# Patient Record
Sex: MALE | Race: WHITE | Employment: UNEMPLOYED | URBAN - NONMETROPOLITAN AREA
[De-identification: names, ages, dates, MRNs, and addresses within clinical notes are randomized per-mention and may not be internally consistent; named-entity substitution may affect disease eponyms.]

---

## 2019-01-06 ENCOUNTER — HOSPITAL ENCOUNTER (EMERGENCY)
Age: 44
Discharge: HOME | End: 2019-01-06
Attending: EMERGENCY MEDICINE
Payer: COMMERCIAL

## 2019-01-06 VITALS
BODY MASS INDEX: 29.22 KG/M2 | HEART RATE: 90 BPM | WEIGHT: 240 LBS | HEIGHT: 76 IN | SYSTOLIC BLOOD PRESSURE: 139 MMHG | TEMPERATURE: 98.5 F | RESPIRATION RATE: 14 BRPM | DIASTOLIC BLOOD PRESSURE: 93 MMHG | OXYGEN SATURATION: 97 %

## 2019-01-06 DIAGNOSIS — Z76.0 ENCOUNTER FOR MEDICATION REFILL: Primary | ICD-10-CM

## 2019-01-06 PROCEDURE — 99281 EMR DPT VST MAYX REQ PHY/QHP: CPT

## 2019-01-06 RX ORDER — LITHIUM CARBONATE 300 MG
300 TABLET ORAL 3 TIMES DAILY
Qty: 27 TABLET | Refills: 0 | Status: SHIPPED | OUTPATIENT
Start: 2019-01-06

## 2019-01-06 RX ORDER — VENLAFAXINE HYDROCHLORIDE 150 MG/1
150 CAPSULE, EXTENDED RELEASE ORAL DAILY
Qty: 9 CAPSULE | Refills: 0 | Status: SHIPPED | OUTPATIENT
Start: 2019-01-06 | End: 2021-12-01

## 2019-01-06 RX ORDER — VENLAFAXINE HYDROCHLORIDE 150 MG/1
150 CAPSULE, EXTENDED RELEASE ORAL DAILY
COMMUNITY
End: 2019-01-06

## 2019-01-06 RX ORDER — RISPERIDONE 1 MG/1
1 TABLET, FILM COATED ORAL 3 TIMES DAILY
Qty: 9 TABLET | Refills: 0 | Status: SHIPPED | OUTPATIENT
Start: 2019-01-06

## 2019-01-06 RX ORDER — GABAPENTIN 600 MG/1
600 TABLET ORAL 3 TIMES DAILY
Qty: 27 TABLET | Refills: 0 | Status: SHIPPED | OUTPATIENT
Start: 2019-01-06 | End: 2021-12-01

## 2019-10-04 ENCOUNTER — APPOINTMENT (OUTPATIENT)
Dept: CT IMAGING | Age: 44
End: 2019-10-04
Payer: COMMERCIAL

## 2019-10-04 ENCOUNTER — HOSPITAL ENCOUNTER (EMERGENCY)
Age: 44
Discharge: HOME | End: 2019-10-05
Attending: EMERGENCY MEDICINE
Payer: COMMERCIAL

## 2019-10-04 ENCOUNTER — APPOINTMENT (OUTPATIENT)
Dept: ULTRASOUND IMAGING | Age: 44
End: 2019-10-04
Payer: COMMERCIAL

## 2019-10-04 DIAGNOSIS — L03.114 CELLULITIS OF LEFT UPPER EXTREMITY: Primary | ICD-10-CM

## 2019-10-04 DIAGNOSIS — S20.222A CONTUSION OF LEFT SIDE OF BACK, INITIAL ENCOUNTER: ICD-10-CM

## 2019-10-04 LAB
BACTERIA: NEGATIVE /HPF
BILIRUBIN URINE: NEGATIVE MG/DL
BLOOD, URINE: NEGATIVE
CLARITY: CLEAR
COLOR: YELLOW
GLUCOSE, URINE: NEGATIVE MG/DL
KETONES, URINE: NEGATIVE MG/DL
LEUKOCYTE ESTERASE, URINE: NEGATIVE
MUCUS: ABNORMAL HPF
NITRITE URINE, QUANTITATIVE: NEGATIVE
PH, URINE: 7 (ref 5–8)
PROTEIN UA: NEGATIVE MG/DL
RBC URINE: ABNORMAL /HPF (ref 0–3)
SPECIFIC GRAVITY UA: 1.01 (ref 1–1.03)
TRANSITIONAL EPITHELIAL: <1 /HPF
TRICHOMONAS: ABNORMAL /HPF
UROBILINOGEN, URINE: NORMAL MG/DL (ref 0.2–1)
WBC UA: 1 /HPF (ref 0–2)

## 2019-10-04 PROCEDURE — 81001 URINALYSIS AUTO W/SCOPE: CPT

## 2019-10-04 PROCEDURE — 74176 CT ABD & PELVIS W/O CONTRAST: CPT

## 2019-10-04 PROCEDURE — 93971 EXTREMITY STUDY: CPT

## 2019-10-04 PROCEDURE — 96372 THER/PROPH/DIAG INJ SC/IM: CPT

## 2019-10-04 PROCEDURE — 6360000002 HC RX W HCPCS: Performed by: EMERGENCY MEDICINE

## 2019-10-04 PROCEDURE — 6370000000 HC RX 637 (ALT 250 FOR IP): Performed by: EMERGENCY MEDICINE

## 2019-10-04 PROCEDURE — 99284 EMERGENCY DEPT VISIT MOD MDM: CPT

## 2019-10-04 RX ORDER — KETOROLAC TROMETHAMINE 30 MG/ML
30 INJECTION, SOLUTION INTRAMUSCULAR; INTRAVENOUS ONCE
Status: COMPLETED | OUTPATIENT
Start: 2019-10-04 | End: 2019-10-04

## 2019-10-04 RX ORDER — ACETAMINOPHEN 325 MG/1
650 TABLET ORAL ONCE
Status: COMPLETED | OUTPATIENT
Start: 2019-10-04 | End: 2019-10-04

## 2019-10-04 RX ORDER — BUPRENORPHINE AND NALOXONE 8; 2 MG/1; MG/1
2 FILM, SOLUBLE BUCCAL; SUBLINGUAL DAILY
COMMUNITY

## 2019-10-04 RX ORDER — SULFAMETHOXAZOLE AND TRIMETHOPRIM 800; 160 MG/1; MG/1
1 TABLET ORAL ONCE
Status: COMPLETED | OUTPATIENT
Start: 2019-10-04 | End: 2019-10-04

## 2019-10-04 RX ORDER — CEPHALEXIN 250 MG/1
500 CAPSULE ORAL ONCE
Status: COMPLETED | OUTPATIENT
Start: 2019-10-04 | End: 2019-10-04

## 2019-10-04 RX ADMIN — ACETAMINOPHEN 650 MG: 325 TABLET ORAL at 22:32

## 2019-10-04 RX ADMIN — SULFAMETHOXAZOLE AND TRIMETHOPRIM 1 TABLET: 800; 160 TABLET ORAL at 22:33

## 2019-10-04 RX ADMIN — CEPHALEXIN 500 MG: 250 CAPSULE ORAL at 22:33

## 2019-10-04 RX ADMIN — KETOROLAC TROMETHAMINE 30 MG: 30 INJECTION, SOLUTION INTRAMUSCULAR; INTRAVENOUS at 22:32

## 2019-10-04 ASSESSMENT — PAIN SCALES - GENERAL
PAINLEVEL_OUTOF10: 8
PAINLEVEL_OUTOF10: 6

## 2019-10-04 ASSESSMENT — PAIN DESCRIPTION - PAIN TYPE: TYPE: ACUTE PAIN

## 2019-10-04 ASSESSMENT — PAIN DESCRIPTION - PROGRESSION: CLINICAL_PROGRESSION: GRADUALLY WORSENING

## 2019-10-04 ASSESSMENT — PAIN DESCRIPTION - LOCATION: LOCATION: FLANK;ARM

## 2019-10-04 ASSESSMENT — PAIN DESCRIPTION - ORIENTATION: ORIENTATION: LEFT

## 2019-10-04 ASSESSMENT — PAIN DESCRIPTION - ONSET: ONSET: ON-GOING

## 2019-10-04 ASSESSMENT — PAIN DESCRIPTION - DESCRIPTORS: DESCRIPTORS: STABBING

## 2019-10-04 ASSESSMENT — PAIN DESCRIPTION - FREQUENCY: FREQUENCY: CONTINUOUS

## 2019-10-05 VITALS
TEMPERATURE: 99.3 F | HEIGHT: 76 IN | SYSTOLIC BLOOD PRESSURE: 151 MMHG | WEIGHT: 237 LBS | OXYGEN SATURATION: 98 % | DIASTOLIC BLOOD PRESSURE: 105 MMHG | RESPIRATION RATE: 15 BRPM | BODY MASS INDEX: 28.86 KG/M2 | HEART RATE: 94 BPM

## 2019-10-05 RX ORDER — ACETAMINOPHEN 325 MG/1
650 TABLET ORAL EVERY 6 HOURS PRN
Qty: 120 TABLET | Refills: 3 | Status: SHIPPED | OUTPATIENT
Start: 2019-10-05

## 2019-10-05 RX ORDER — SULFAMETHOXAZOLE AND TRIMETHOPRIM 800; 160 MG/1; MG/1
1 TABLET ORAL 2 TIMES DAILY
Qty: 20 TABLET | Refills: 0 | Status: SHIPPED | OUTPATIENT
Start: 2019-10-05 | End: 2019-10-15

## 2019-10-05 RX ORDER — CEPHALEXIN 500 MG/1
500 CAPSULE ORAL 2 TIMES DAILY
Qty: 20 CAPSULE | Refills: 0 | Status: SHIPPED | OUTPATIENT
Start: 2019-10-05 | End: 2019-10-15

## 2019-10-05 RX ORDER — NAPROXEN 500 MG/1
500 TABLET ORAL 2 TIMES DAILY
Qty: 60 TABLET | Refills: 0 | Status: ON HOLD | OUTPATIENT
Start: 2019-10-05 | End: 2021-12-03 | Stop reason: HOSPADM

## 2020-08-29 ENCOUNTER — HOSPITAL ENCOUNTER (EMERGENCY)
Age: 45
Discharge: HOME | End: 2020-08-29
Attending: EMERGENCY MEDICINE
Payer: COMMERCIAL

## 2020-08-29 VITALS
TEMPERATURE: 98 F | BODY MASS INDEX: 28.86 KG/M2 | HEART RATE: 89 BPM | RESPIRATION RATE: 16 BRPM | HEIGHT: 76 IN | SYSTOLIC BLOOD PRESSURE: 124 MMHG | WEIGHT: 237 LBS | DIASTOLIC BLOOD PRESSURE: 87 MMHG | OXYGEN SATURATION: 94 %

## 2020-08-29 PROCEDURE — 99283 EMERGENCY DEPT VISIT LOW MDM: CPT

## 2020-08-29 NOTE — ED NOTES
Discharge instructions reviewed, pt verbalized understanding. Pt left in police custody.       Noemi Concepcion RN  08/29/20 8203

## 2020-08-29 NOTE — ED PROVIDER NOTES
resource strain: Not on file    Food insecurity     Worry: Not on file     Inability: Not on file    Transportation needs     Medical: Not on file     Non-medical: Not on file   Tobacco Use    Smoking status: Current Every Day Smoker     Packs/day: 1.50     Types: Cigarettes    Smokeless tobacco: Never Used   Substance and Sexual Activity    Alcohol use: No    Drug use: Yes     Frequency: 1.0 times per week     Types: Marijuana, Cocaine, IV    Sexual activity: Yes   Lifestyle    Physical activity     Days per week: Not on file     Minutes per session: Not on file    Stress: Not on file   Relationships    Social connections     Talks on phone: Not on file     Gets together: Not on file     Attends [employer] service: Not on file     Active member of club or organization: Not on file     Attends meetings of clubs or organizations: Not on file     Relationship status: Not on file    Intimate partner violence     Fear of current or ex partner: Not on file     Emotionally abused: Not on file     Physically abused: Not on file     Forced sexual activity: Not on file   Other Topics Concern    Not on file   Social History Narrative    Not on file     No current facility-administered medications for this encounter. Current Outpatient Medications   Medication Sig Dispense Refill    naproxen (NAPROSYN) 500 MG tablet Take 1 tablet by mouth 2 times daily 60 tablet 0    acetaminophen (AMINOFEN) 325 MG tablet Take 2 tablets by mouth every 6 hours as needed for Pain 120 tablet 3    buprenorphine-naloxone (SUBOXONE) 8-2 MG FILM SL film Place 2 Film under the tongue daily.  gabapentin (NEURONTIN) 600 MG tablet Take 1 tablet by mouth 3 times daily for 27 doses. . 27 tablet 0    lithium 300 MG tablet Take 1 tablet by mouth 3 times daily (Patient taking differently: Take 300 mg by mouth 3 times daily Indications: 600mg in am and 300mg in pm ) 27 tablet 0    risperiDONE (RISPERDAL) 1 MG tablet Take 1 tablet by mouth three times daily (Patient taking differently: Take 1 mg by mouth 2 times daily ) 9 tablet 0    venlafaxine (EFFEXOR XR) 150 MG extended release capsule Take 1 capsule by mouth daily 9 capsule 0     No Known Allergies    Nursing Notes Reviewed    Physical Exam:  ED Triage Vitals [08/29/20 1421]   Enc Vitals Group      BP (!) 170/111      Pulse 128      Resp 18      Temp 98 °F (36.7 °C)      Temp Source Oral      SpO2 96 %      Weight 237 lb (107.5 kg)      Height 6' 4\" (1.93 m)      Head Circumference       Peak Flow       Pain Score       Pain Loc       Pain Edu? Excl. in [address]? My pulse ox interpretation is - normal    General appearance:  No acute distress. Sitting upright in hallway bed. Pleasant. Skin:  Warm. Dry. No diaphoresis. Eye:  Extraocular movements intact. Pupils equal round react to light. Ears, nose, mouth and throat:  Oral mucosa moist.  No cephalohematoma, bustos sign or raccoon eyes. Neck:  Trachea midline. No midline bony cervical tenderness palpation. Extremity:  Normal ROM. No gross deformity ×4 extremities. Heart:  Regular  Perfusion:  Intact. Respiratory:  Respirations nonlabored. Speaking clearly in full sentences. No respiratory distress. Abdominal:   Non distended. Neurological:  Alert and oriented times 3. Sensation intact to light touch to distal upper/lower extremities; 5/5 and symmetric  and dorsi/plantar flexion. And the toilet steady gait. I have reviewed and interpreted all of the currently available lab results from this visit (if applicable):  No results found for this visit on 08/29/20.    Radiographs (if obtained):  [] The following radiograph was interpreted by myself in the absence of a radiologist:   [] Radiologist's Report Reviewed:  No orders to display         EKG (if obtained): (All EKG's are interpreted by myself in the absence of a cardiologist)    Chart review shows recent radiographs:  No results found.    MDM:  Patient here for opiate overdose, received Narcan in route was awake on arrival, has no complaints. Patient was monitored for approximately one hour and continued to have no symptoms. Patient will be discharged to follow-up as an outpatient, patient referrals given, they understand and agree, return warnings given. Clinical Impression:  1. Accidental fentanyl overdose, initial encounter [facility])      Disposition referral (if applicable): [facility]  [phone]  Schedule an appointment as soon as possible for a visit   [address],[place] [address] PRIMARY CARE PROVIDER (SEE BELOW)    [facility] Emergency Department  [facility] [id number] [phone]  Today  If symptoms worsen    Disposition medications (if applicable):  New Prescriptions    No medications on file       Comment: Please note this report has been produced using speech recognition software and may contain errors related to that system including errors in grammar, punctuation, and spelling, as well as words and phrases that may be inappropriate. If there are any questions or concerns please feel free to contact the dictating provider for clarification.        Britt Patel MD  08/29/20 9625       Britt Patel MD  08/29/20 5116

## 2020-09-09 ENCOUNTER — APPOINTMENT (OUTPATIENT)
Dept: CT IMAGING | Age: 45
End: 2020-09-09
Payer: COMMERCIAL

## 2020-09-09 ENCOUNTER — HOSPITAL ENCOUNTER (EMERGENCY)
Age: 45
Discharge: ANOTHER HOSPITAL | End: 2020-09-09
Attending: EMERGENCY MEDICINE
Payer: COMMERCIAL

## 2020-09-09 VITALS
OXYGEN SATURATION: 97 % | TEMPERATURE: 97.9 F | BODY MASS INDEX: 28.86 KG/M2 | SYSTOLIC BLOOD PRESSURE: 142 MMHG | HEIGHT: 76 IN | DIASTOLIC BLOOD PRESSURE: 116 MMHG | RESPIRATION RATE: 16 BRPM | WEIGHT: 237 LBS | HEART RATE: 61 BPM

## 2020-09-09 LAB
APTT: 32.7 SECONDS (ref 25.1–37.1)
BASOPHILS ABSOLUTE: 0 K/CU MM
BASOPHILS RELATIVE PERCENT: 0.3 % (ref 0–1)
DIFFERENTIAL TYPE: ABNORMAL
EOSINOPHILS ABSOLUTE: 0.2 K/CU MM
EOSINOPHILS RELATIVE PERCENT: 1.5 % (ref 0–3)
HCT VFR BLD CALC: 42.4 % (ref 42–52)
HEMOGLOBIN: 14.7 GM/DL (ref 13.5–18)
IMMATURE NEUTROPHIL %: 0.3 % (ref 0–0.43)
INR BLD: 0.98 INDEX
LACTATE: 1.1 MMOL/L (ref 0.4–2)
LYMPHOCYTES ABSOLUTE: 2.3 K/CU MM
LYMPHOCYTES RELATIVE PERCENT: 20.2 % (ref 24–44)
MCH RBC QN AUTO: 30.8 PG (ref 27–31)
MCHC RBC AUTO-ENTMCNC: 34.7 % (ref 32–36)
MCV RBC AUTO: 88.7 FL (ref 78–100)
MONOCYTES ABSOLUTE: 0.9 K/CU MM
MONOCYTES RELATIVE PERCENT: 7.4 % (ref 0–4)
NUCLEATED RBC %: 0 %
PDW BLD-RTO: 13.5 % (ref 11.7–14.9)
PLATELET # BLD: 341 K/CU MM (ref 140–440)
PMV BLD AUTO: 10.1 FL (ref 7.5–11.1)
PROTHROMBIN TIME: 11.9 SECONDS (ref 11.7–14.5)
RBC # BLD: 4.78 M/CU MM (ref 4.6–6.2)
REASON FOR REJECTION: NORMAL
REJECTED TEST: NORMAL
SEGMENTED NEUTROPHILS ABSOLUTE COUNT: 8.1 K/CU MM
SEGMENTED NEUTROPHILS RELATIVE PERCENT: 70.3 % (ref 36–66)
TOTAL IMMATURE NEUTOROPHIL: 0.03 K/CU MM
TOTAL NUCLEATED RBC: 0 K/CU MM
WBC # BLD: 11.5 K/CU MM (ref 4–10.5)

## 2020-09-09 PROCEDURE — 72125 CT NECK SPINE W/O DYE: CPT

## 2020-09-09 PROCEDURE — 70450 CT HEAD/BRAIN W/O DYE: CPT

## 2020-09-09 PROCEDURE — 85610 PROTHROMBIN TIME: CPT

## 2020-09-09 PROCEDURE — 96361 HYDRATE IV INFUSION ADD-ON: CPT

## 2020-09-09 PROCEDURE — 6360000002 HC RX W HCPCS: Performed by: PHYSICIAN ASSISTANT

## 2020-09-09 PROCEDURE — 85730 THROMBOPLASTIN TIME PARTIAL: CPT

## 2020-09-09 PROCEDURE — 85025 COMPLETE CBC W/AUTO DIFF WBC: CPT

## 2020-09-09 PROCEDURE — 99285 EMERGENCY DEPT VISIT HI MDM: CPT

## 2020-09-09 PROCEDURE — 96375 TX/PRO/DX INJ NEW DRUG ADDON: CPT

## 2020-09-09 PROCEDURE — 2580000003 HC RX 258: Performed by: EMERGENCY MEDICINE

## 2020-09-09 PROCEDURE — 80053 COMPREHEN METABOLIC PANEL: CPT

## 2020-09-09 PROCEDURE — 6360000002 HC RX W HCPCS: Performed by: EMERGENCY MEDICINE

## 2020-09-09 PROCEDURE — 2580000003 HC RX 258: Performed by: PHYSICIAN ASSISTANT

## 2020-09-09 PROCEDURE — 96365 THER/PROPH/DIAG IV INF INIT: CPT

## 2020-09-09 PROCEDURE — 83605 ASSAY OF LACTIC ACID: CPT

## 2020-09-09 PROCEDURE — 2500000003 HC RX 250 WO HCPCS: Performed by: EMERGENCY MEDICINE

## 2020-09-09 PROCEDURE — 83690 ASSAY OF LIPASE: CPT

## 2020-09-09 RX ORDER — DIPHENHYDRAMINE HYDROCHLORIDE 50 MG/ML
50 INJECTION INTRAMUSCULAR; INTRAVENOUS ONCE
Status: COMPLETED | OUTPATIENT
Start: 2020-09-09 | End: 2020-09-09

## 2020-09-09 RX ORDER — MORPHINE SULFATE 4 MG/ML
4 INJECTION, SOLUTION INTRAMUSCULAR; INTRAVENOUS ONCE
Status: COMPLETED | OUTPATIENT
Start: 2020-09-09 | End: 2020-09-09

## 2020-09-09 RX ORDER — METOCLOPRAMIDE HYDROCHLORIDE 5 MG/ML
10 INJECTION INTRAMUSCULAR; INTRAVENOUS ONCE
Status: COMPLETED | OUTPATIENT
Start: 2020-09-09 | End: 2020-09-09

## 2020-09-09 RX ORDER — 0.9 % SODIUM CHLORIDE 0.9 %
1000 INTRAVENOUS SOLUTION INTRAVENOUS ONCE
Status: COMPLETED | OUTPATIENT
Start: 2020-09-09 | End: 2020-09-09

## 2020-09-09 RX ADMIN — DEXTROSE MONOHYDRATE 5 MG/HR: 50 INJECTION, SOLUTION INTRAVENOUS at 07:27

## 2020-09-09 RX ADMIN — MORPHINE SULFATE 4 MG: 4 INJECTION, SOLUTION INTRAMUSCULAR; INTRAVENOUS at 06:42

## 2020-09-09 RX ADMIN — DIPHENHYDRAMINE HYDROCHLORIDE 50 MG: 50 INJECTION INTRAMUSCULAR; INTRAVENOUS at 06:09

## 2020-09-09 RX ADMIN — METOCLOPRAMIDE 10 MG: 5 INJECTION, SOLUTION INTRAMUSCULAR; INTRAVENOUS at 06:10

## 2020-09-09 RX ADMIN — SODIUM CHLORIDE 1000 ML: 9 INJECTION, SOLUTION INTRAVENOUS at 06:09

## 2020-09-09 ASSESSMENT — PAIN DESCRIPTION - FREQUENCY: FREQUENCY: CONTINUOUS

## 2020-09-09 ASSESSMENT — PAIN SCALES - GENERAL
PAINLEVEL_OUTOF10: 10
PAINLEVEL_OUTOF10: 10

## 2020-09-09 ASSESSMENT — PAIN DESCRIPTION - PAIN TYPE: TYPE: ACUTE PAIN

## 2020-09-09 ASSESSMENT — PAIN DESCRIPTION - LOCATION: LOCATION: HEAD

## 2020-09-09 NOTE — ED NOTES
[address] [facility], TRANSFERRED CALL AT 6:22 AM     [facility]  09/09/20 9086

## 2020-09-09 NOTE — ED PROVIDER NOTES
Emergency [address] EMERGENCY DEPARTMENT    Patient: Melchor Garcia  MRN: [id number]  : 1975  Date of Evaluation: 2020  ED Provider: Glen Parra PA-C    Chief Complaint       Chief Complaint   Patient presents with    Headache       [place]     Melchor Garcia is a 39 y.o. male who presents to the emergency department for a headache. Patient states he was arrested after a heroin overdose last week. He states he is on Suboxone but did not receive it while in [facility] so he had a seizure last Monday, 2020, where he fell out of his bunk and hit his head. He was not transported for evaluation at time of the fall. He states he has had a headache ever since. Generalized, aching/sharp, severity 10/10. He states he got out of [facility] on 9/3/2020 and resumed his Suboxone that day. He does admit to using heroin once since his release from [facility] because he thought it would help with his headache. Today he started having nausea and vomiting as well. He denies any neck or back pain. Denies chest pain or sob. Denies abd pain. Denies any other injury from falling out of bunk. ROS     CONSTITUTIONAL:  Denies fever. EYES:  Denies visual changes. HEAD:  + headache. ENT:  Denies earache, nasal congestion, sore throat. NECK:  Denies neck pain. RESPIRATORY:  Denies any shortness of breath. CARDIOVASCULAR:  Denies chest pain. GI:  + n/v.  :  Denies urinary symptoms. MUSCULOSKELETAL:  Denies extremity pain or swelling. BACK:  Denies back pain. INTEGUMENT:  Denies skin changes. LYMPHATIC:  Denies lymphadenopathy. NEUROLOGIC:  + seizure.     Past History     Past Medical History:   Diagnosis Date    Back pain     Bipolar 1 disorder ([facility]     Bipolar affect, depressed ([facility]     Bladder cancer ([facility]     Depression     Hepatitis C     Schizophrenia ([facility]      Past Surgical History:   Procedure Laterality Date    BLADDER SURGERY States had bladder cancer surgery     Social History     Socioeconomic History    Marital status: Single     Spouse name: Not on file    Number of children: Not on file    Years of education: Not on file    Highest education level: Not on file   Occupational History    Not on file   Social Needs    Financial resource strain: Not on file    Food insecurity     Worry: Not on file     Inability: Not on file    Transportation needs     Medical: Not on file     Non-medical: Not on file   Tobacco Use    Smoking status: Current Every Day Smoker     Packs/day: 1.50     Types: Cigarettes    Smokeless tobacco: Never Used   Substance and Sexual Activity    Alcohol use: No    Drug use: Yes     Frequency: 1.0 times per week     Types: Marijuana, Cocaine, IV    Sexual activity: Yes   Lifestyle    Physical activity     Days per week: Not on file     Minutes per session: Not on file    Stress: Not on file   Relationships    Social connections     Talks on phone: Not on file     Gets together: Not on file     Attends Pent[employer] service: Not on file     Active member of club or organization: Not on file     Attends meetings of clubs or organizations: Not on file     Relationship status: Not on file    Intimate partner violence     Fear of current or ex partner: Not on file     Emotionally abused: Not on file     Physically abused: Not on file     Forced sexual activity: Not on file   Other Topics Concern    Not on file   Social History Narrative    Not on file       Medications/Allergies     Previous Medications    ACETAMINOPHEN (AMINOFEN) 325 MG TABLET    Take 2 tablets by mouth every 6 hours as needed for Pain    BUPRENORPHINE-NALOXONE (SUBOXONE) 8-2 MG FILM SL FILM    Place 2 Film under the tongue daily. GABAPENTIN (NEURONTIN) 600 MG TABLET    Take 1 tablet by mouth 3 times daily for 27 doses. Tonye Cogan     LITHIUM 300 MG TABLET    Take 1 tablet by mouth 3 times daily    NAPROXEN (NAPROSYN) 500 MG TABLET    Take 1 tablet by mouth 2 times daily    RISPERIDONE (RISPERDAL) 1 MG TABLET    Take 1 tablet by mouth three times daily    VENLAFAXINE (EFFEXOR XR) 150 MG EXTENDED RELEASE CAPSULE    Take 1 capsule by mouth daily     No Known Allergies     Physical Exam       ED Triage Vitals [09/09/20 0508]   BP Temp Temp Source Pulse Resp SpO2 Height Weight   (!) 161/95 97.9 °F (36.6 °C) Oral 61 16 97 % -- --     GENERAL APPEARANCE:  Well-developed, well-nourished, no acute distress. HEAD:  NC/AT. EYES:  Sclera anicteric. ENT:  Ears, nose, mouth normal.     NECK:  Supple. No cervical spinal tenderness. CARDIO:  RRR. LUNGS:   CTAB. Respirations unlabored. ABDOMEN:  Soft, non-distended, non-tender. BS active. Dry-heaving but no active vomiting at time of assessment. EXTREMITIES:  No acute deformities. SKIN:  Warm and dry. NEUROLOGICAL:  Alert and oriented. PSYCHIATRIC:  Normal mood. Diagnostics     Labs:  Labs Reviewed   CBC WITH AUTO DIFFERENTIAL - Abnormal; Notable for the following components:       Result Value    WBC 11.5 (*)     Segs Relative 70.3 (*)     Lymphocytes % 20.2 (*)     Monocytes % 7.4 (*)     All other components within normal limits   LACTIC ACID, PLASMA   PROTIME-INR   APTT   SPECIMEN REJECTION   COMPREHENSIVE METABOLIC PANEL W/ REFLEX TO MG FOR LOW K   LIPASE     Radiographs:  Ct Head Wo Contrast    Addendum Date: 9/9/2020    ADDENDUM: Findings were discussed with MARVIN Lo at 6:25 am on 9/9/2020. Result Date: 9/9/2020  EXAMINATION: CT OF THE HEAD WITHOUT CONTRAST  9/9/2020 6:01 am TECHNIQUE: CT of the head was performed without the administration of intravenous contrast. Dose modulation, iterative reconstruction, and/or weight based adjustment of the mA/kV was utilized to reduce the radiation dose to as low as reasonably achievable. COMPARISON: None.  HISTORY: ORDERING SYSTEM PROVIDED HISTORY: fall out of bed during a seizure on 8/31, headache TECHNOLOGIST PROVIDED HISTORY: Reason for exam:->fall out of bed during a seizure on 8/31, headache Has a \"code stroke\" or \"stroke alert\" been called? ->No Reason for Exam: fall a week ago now sever head pain FINDINGS: BRAIN/VENTRICLES: 1.5 cm left hemispheric subdural hematoma with acute and chronic components. There is mass effect upon the underlying brain parenchyma with left right midline shift of approximately 1.4 cm. Suspected subtle focus of intraparenchymal hemorrhage within the medial aspect of the left temporal lobe adjacent to the left tentorial leaflet. . ORBITS: The visualized portion of the orbits demonstrate no acute abnormality. SINUSES: Mucous retention cyst or polyp within the left maxillary sinus, otherwise the paranasal sinuses and mastoids are clear. SOFT TISSUES/SKULL:  No acute abnormality of the visualized skull or soft tissues. Left hemispheric subdural hematoma with acute on chronic components measuring 1.5 cm in diameter. Mass effect upon the underlying brain parenchyma resulting in left-to-right midline shift of approximately 1.4 cm. Small focus of intraparenchymal hemorrhage within the medial aspect of the left temporal lobe adjacent to the left tentorial leaflet. Ct Cervical Spine Wo Contrast    Result Date: 9/9/2020  EXAMINATION: CT OF THE CERVICAL SPINE WITHOUT CONTRAST 9/9/2020 7:18 am TECHNIQUE: CT of the cervical spine was performed without the administration of intravenous contrast. Multiplanar reformatted images are provided for review. Dose modulation, iterative reconstruction, and/or weight based adjustment of the mA/kV was utilized to reduce the radiation dose to as low as reasonably achievable. COMPARISON: None.  HISTORY: ORDERING SYSTEM PROVIDED HISTORY: injury TECHNOLOGIST PROVIDED HISTORY: Reason for exam:->injury Reason for Exam: fall Acuity: Acute Type of Exam: Initial Mechanism of Injury: fell 1 week ago Relevant Medical/Surgical History: none FINDINGS: BONES/ALIGNMENT: There is no acute fracture or traumatic malalignment. DEGENERATIVE CHANGES: No significant degenerative disc disease. SOFT TISSUES: There is no prevertebral soft tissue swelling. No acute abnormality of the cervical spine. ED Course and MDM   -  Patient seen and evaluated in the emergency department. -  Triage and nursing notes reviewed and incorporated. -  Old chart records reviewed and incorporated. -  Supervising physician was Dr. Jose Holder. Patient was seen independently  -  Work-up was initiated--pending results at the end of my shift. Case discussed with [employer] PA-C. Please see his note for further details, including final disposition. In light of current events, I did utilize appropriate PPE (including surgical face mask, safety glasses, and gloves, as recommended by the health facility/national standard best practice, during my bedside interactions with the patient. Patient was also masked throughout ED course. Final Impression      1. Subdural hematoma ([address]    2. Intraparenchymal hematoma of brain, left, without loss of consciousness, initial encounter ([address]    3.  Midline shift of brain due to hematoma            DISPOSITION        Cassie Henry, [address] [place], [place]  09/12/20 4625

## 2020-09-09 NOTE — ED PROVIDER NOTES
I assumed care of this patient from Jennifer Hoover at 0600 pending labs and CT evaluation. In short patient does report history of heroin use. Was arrested and patient reports seizure related to withdrawal while he was incarcerated. Morgan Viola out of bed and hit his head. Has had headaches since. Developed vomiting today. Patient returned from CT and found to have subarachnoid hemorrhage, subdural hemorrhage with a 17 mm shift. Neuro exam intact. Dr Abeba Fernandez informed and performing eval. She spoke to [address] [place] [address] to arrange transfer.             Kj Beckford PA-C  09/09/20 1783

## 2020-09-09 NOTE — ED TRIAGE NOTES
Patient reports he had a seizure causing him to fall and hit his head while in senior care on 8/31/2020. Complains of extreme pain in head ever since the seizure accompanied by vomiting.

## 2020-09-09 NOTE — ED PROVIDER NOTES
I independently examined and evaluated Heber Lanza. In brief, 27-year-old male presents with concern for headache. He was in [facility] last week, states he fell out of the bed and struck his head, he had had a seizure from withdrawal he tells me. I was asked to see him after his head CT was done. He is not having any weakness or numbness in his extremities. No difficulty ambulating. Has had a headache since that incident 7 to 10 days ago. He states this morning his headache was worse and he was vomiting and that is what prompted him to come in. No fevers. No vision changes. The headache is across the front of his head. Denies other trauma, states has not fallen since that occurred. He is not on blood thinners. He does admit to using heroin. .    Focused exam revealed patient laying on cot, regular rate and rhythm, nonlabored respirations. Abdomen soft and nondistended. Alert and oriented, finger-nose testing without ataxia, gait is deferred. Strength is 5 out of 5 bilateral upper and lower extremities. Sensation intact light touch throughout. Cranial nerves II through XII are grossly intact. Deep tendon reflexes intact and equal at bilateral patella. .    ED course: Neuro exam is intact, when he came back from head CT the tech came over to let me know he was concerned about the CT head, I pulled it up and does appear to have significant midline shift, at least greater than 1 cm, also appears to have subdural and questionable subarachnoid or intraparenchymal hemorrhage when I am looking at it. It is on the left side. Given the amount of shift that he has I placed consult [facility] immediately, as he prefers to go there rather than [facility]. Nicardipine has been ordered for blood pressure control, morphine for pain, and already received a dose of Reglan and Benadryl without improvement.     Spoke with Heidi at [facility] at 1055 to give report, she will call back once speaks to their team.     Radiology called at 0135 to give official read. [phone] we called [facility] back as had not heard,spoke with Heidi again at [facility]- Dr. Barby Gordon is the accepting to their ED, I gave report to [facility]. We will see if flight is possible for transport given the midline shift. 0980- they are not flying due to fog but are working on ground transport. Ground transport is coming, ETA 30 minutes. He remains in stable condition    Total critical care time today provided was 32 minutes. This excludes seperately billable procedure. Critical care time provided for head bleed that required close evaluation and/or intervention with concern for patient decompensation. All diagnostic, treatment, and disposition decisions were made by myself in conjunction with the advanced practice provider. For all further details of the patient's emergency department visit, please see the advanced practice provider's documentation. Comment: Please note this report has been produced using speech recognition software and may contain errors related to that system including errors in grammar, punctuation, and spelling, as well as words and phrases that may be inappropriate. If there are any questions or concerns please feel free to contact the dictating provider for clarification. Roni Amador MD  09/09/20 1569        Care flight here and transporting patient, neuro exam remains intact, patient stable for transfer.      Roni Amador MD  09/09/20 3109

## 2021-11-30 ENCOUNTER — APPOINTMENT (OUTPATIENT)
Dept: CT IMAGING | Age: 46
DRG: 710 | End: 2021-11-30
Payer: COMMERCIAL

## 2021-11-30 ENCOUNTER — HOSPITAL ENCOUNTER (INPATIENT)
Age: 46
LOS: 2 days | Discharge: HOME | DRG: 710 | End: 2021-12-03
Attending: EMERGENCY MEDICINE | Admitting: INTERNAL MEDICINE
Payer: COMMERCIAL

## 2021-11-30 DIAGNOSIS — L02.419 CELLULITIS AND ABSCESS OF LEG: Primary | ICD-10-CM

## 2021-11-30 DIAGNOSIS — A41.9 SEPTICEMIA (HCC): ICD-10-CM

## 2021-11-30 DIAGNOSIS — L03.119 CELLULITIS AND ABSCESS OF LEG: Primary | ICD-10-CM

## 2021-11-30 LAB
ALBUMIN SERPL-MCNC: 4.5 GM/DL (ref 3.4–5)
ALP BLD-CCNC: 91 IU/L (ref 40–129)
ALT SERPL-CCNC: 45 U/L (ref 10–40)
ANION GAP SERPL CALCULATED.3IONS-SCNC: 15 MMOL/L (ref 4–16)
AST SERPL-CCNC: 25 IU/L (ref 15–37)
BASOPHILS ABSOLUTE: 0 K/CU MM
BASOPHILS RELATIVE PERCENT: 0.2 % (ref 0–1)
BILIRUB SERPL-MCNC: 0.9 MG/DL (ref 0–1)
BUN BLDV-MCNC: 13 MG/DL (ref 6–23)
CALCIUM SERPL-MCNC: 9.8 MG/DL (ref 8.3–10.6)
CHLORIDE BLD-SCNC: 104 MMOL/L (ref 99–110)
CO2: 22 MMOL/L (ref 21–32)
CREAT SERPL-MCNC: 0.9 MG/DL (ref 0.9–1.3)
DIFFERENTIAL TYPE: ABNORMAL
EOSINOPHILS ABSOLUTE: 0 K/CU MM
EOSINOPHILS RELATIVE PERCENT: 0.2 % (ref 0–3)
GFR AFRICAN AMERICAN: >60 ML/MIN/1.73M2
GFR NON-AFRICAN AMERICAN: >60 ML/MIN/1.73M2
GLUCOSE BLD-MCNC: 110 MG/DL (ref 70–99)
HCT VFR BLD CALC: 50.5 % (ref 42–52)
HEMOGLOBIN: 16.7 GM/DL (ref 13.5–18)
IMMATURE NEUTROPHIL %: 0.3 % (ref 0–0.43)
LACTATE: 2.1 MMOL/L (ref 0.4–2)
LYMPHOCYTES ABSOLUTE: 2.6 K/CU MM
LYMPHOCYTES RELATIVE PERCENT: 19.6 % (ref 24–44)
MCH RBC QN AUTO: 30.1 PG (ref 27–31)
MCHC RBC AUTO-ENTMCNC: 33.1 % (ref 32–36)
MCV RBC AUTO: 91 FL (ref 78–100)
MONOCYTES ABSOLUTE: 0.9 K/CU MM
MONOCYTES RELATIVE PERCENT: 6.6 % (ref 0–4)
NUCLEATED RBC %: 0 %
PDW BLD-RTO: 13.1 % (ref 11.7–14.9)
PLATELET # BLD: 329 K/CU MM (ref 140–440)
PMV BLD AUTO: 9.3 FL (ref 7.5–11.1)
POTASSIUM SERPL-SCNC: 4.2 MMOL/L (ref 3.5–5.1)
RBC # BLD: 5.55 M/CU MM (ref 4.6–6.2)
SEGMENTED NEUTROPHILS ABSOLUTE COUNT: 9.8 K/CU MM
SEGMENTED NEUTROPHILS RELATIVE PERCENT: 73.1 % (ref 36–66)
SODIUM BLD-SCNC: 141 MMOL/L (ref 135–145)
TOTAL IMMATURE NEUTOROPHIL: 0.04 K/CU MM
TOTAL NUCLEATED RBC: 0 K/CU MM
TOTAL PROTEIN: 8.1 GM/DL (ref 6.4–8.2)
WBC # BLD: 13.5 K/CU MM (ref 4–10.5)

## 2021-11-30 PROCEDURE — 96365 THER/PROPH/DIAG IV INF INIT: CPT

## 2021-11-30 PROCEDURE — 83605 ASSAY OF LACTIC ACID: CPT

## 2021-11-30 PROCEDURE — 87186 SC STD MICRODIL/AGAR DIL: CPT

## 2021-11-30 PROCEDURE — 87070 CULTURE OTHR SPECIMN AEROBIC: CPT

## 2021-11-30 PROCEDURE — 87040 BLOOD CULTURE FOR BACTERIA: CPT

## 2021-11-30 PROCEDURE — 85025 COMPLETE CBC W/AUTO DIFF WBC: CPT

## 2021-11-30 PROCEDURE — 6360000004 HC RX CONTRAST MEDICATION: Performed by: EMERGENCY MEDICINE

## 2021-11-30 PROCEDURE — 2580000003 HC RX 258: Performed by: EMERGENCY MEDICINE

## 2021-11-30 PROCEDURE — 96367 TX/PROPH/DG ADDL SEQ IV INF: CPT

## 2021-11-30 PROCEDURE — 2500000003 HC RX 250 WO HCPCS: Performed by: PHYSICIAN ASSISTANT

## 2021-11-30 PROCEDURE — 99283 EMERGENCY DEPT VISIT LOW MDM: CPT

## 2021-11-30 PROCEDURE — 87077 CULTURE AEROBIC IDENTIFY: CPT

## 2021-11-30 PROCEDURE — 87075 CULTR BACTERIA EXCEPT BLOOD: CPT

## 2021-11-30 PROCEDURE — 96375 TX/PRO/DX INJ NEW DRUG ADDON: CPT

## 2021-11-30 PROCEDURE — 80053 COMPREHEN METABOLIC PANEL: CPT

## 2021-11-30 PROCEDURE — 73701 CT LOWER EXTREMITY W/DYE: CPT

## 2021-11-30 PROCEDURE — 2580000003 HC RX 258: Performed by: PHYSICIAN ASSISTANT

## 2021-11-30 PROCEDURE — 6360000002 HC RX W HCPCS: Performed by: PHYSICIAN ASSISTANT

## 2021-11-30 PROCEDURE — 6370000000 HC RX 637 (ALT 250 FOR IP): Performed by: PHYSICIAN ASSISTANT

## 2021-11-30 RX ORDER — KETOROLAC TROMETHAMINE 30 MG/ML
15 INJECTION, SOLUTION INTRAMUSCULAR; INTRAVENOUS ONCE
Status: COMPLETED | OUTPATIENT
Start: 2021-11-30 | End: 2021-11-30

## 2021-11-30 RX ORDER — SODIUM CHLORIDE, SODIUM LACTATE, POTASSIUM CHLORIDE, AND CALCIUM CHLORIDE .6; .31; .03; .02 G/100ML; G/100ML; G/100ML; G/100ML
1000 INJECTION, SOLUTION INTRAVENOUS ONCE
Status: COMPLETED | OUTPATIENT
Start: 2021-11-30 | End: 2021-12-01

## 2021-11-30 RX ORDER — LIDOCAINE HYDROCHLORIDE 20 MG/ML
10 INJECTION, SOLUTION EPIDURAL; INFILTRATION; INTRACAUDAL; PERINEURAL ONCE
Status: COMPLETED | OUTPATIENT
Start: 2021-11-30 | End: 2021-11-30

## 2021-11-30 RX ORDER — CLINDAMYCIN PHOSPHATE 600 MG/50ML
600 INJECTION INTRAVENOUS ONCE
Status: COMPLETED | OUTPATIENT
Start: 2021-11-30 | End: 2021-11-30

## 2021-11-30 RX ORDER — HYDROCODONE BITARTRATE AND ACETAMINOPHEN 5; 325 MG/1; MG/1
1 TABLET ORAL ONCE
Status: COMPLETED | OUTPATIENT
Start: 2021-11-30 | End: 2021-11-30

## 2021-11-30 RX ORDER — 0.9 % SODIUM CHLORIDE 0.9 %
1000 INTRAVENOUS SOLUTION INTRAVENOUS ONCE
Status: COMPLETED | OUTPATIENT
Start: 2021-11-30 | End: 2021-11-30

## 2021-11-30 RX ORDER — VANCOMYCIN 2 G/400ML
20 INJECTION, SOLUTION INTRAVENOUS ONCE
Status: COMPLETED | OUTPATIENT
Start: 2021-11-30 | End: 2021-12-01

## 2021-11-30 RX ADMIN — HYDROCODONE BITARTRATE AND ACETAMINOPHEN 1 TABLET: 5; 325 TABLET ORAL at 20:45

## 2021-11-30 RX ADMIN — IOPAMIDOL 80 ML: 755 INJECTION, SOLUTION INTRAVENOUS at 22:05

## 2021-11-30 RX ADMIN — LIDOCAINE HYDROCHLORIDE 10 ML: 20 INJECTION, SOLUTION EPIDURAL; INFILTRATION; INTRACAUDAL at 21:49

## 2021-11-30 RX ADMIN — VANCOMYCIN 2000 MG: 2 INJECTION, SOLUTION INTRAVENOUS at 22:55

## 2021-11-30 RX ADMIN — CLINDAMYCIN PHOSPHATE 600 MG: 600 INJECTION, SOLUTION INTRAVENOUS at 20:44

## 2021-11-30 RX ADMIN — SODIUM CHLORIDE 1000 ML: 9 INJECTION, SOLUTION INTRAVENOUS at 20:43

## 2021-11-30 RX ADMIN — KETOROLAC TROMETHAMINE 15 MG: 30 INJECTION, SOLUTION INTRAMUSCULAR at 20:45

## 2021-11-30 RX ADMIN — SODIUM CHLORIDE, POTASSIUM CHLORIDE, SODIUM LACTATE AND CALCIUM CHLORIDE 1000 ML: 600; 310; 30; 20 INJECTION, SOLUTION INTRAVENOUS at 23:23

## 2021-11-30 ASSESSMENT — PAIN SCALES - GENERAL
PAINLEVEL_OUTOF10: 4
PAINLEVEL_OUTOF10: 6

## 2021-11-30 ASSESSMENT — PAIN DESCRIPTION - LOCATION: LOCATION: LEG

## 2021-11-30 ASSESSMENT — PAIN DESCRIPTION - ORIENTATION: ORIENTATION: LEFT

## 2021-11-30 NOTE — ED PROVIDER NOTES
EMERGENCY DEPARTMENT ENCOUNTER      PCP: No primary care provider on file. CHIEF COMPLAINT    Chief Complaint   Patient presents with    Abscess     left thigh, iv drug user-used meth several days ago and noticed abscess last night and draining pus, used meth in close proximity to infection last night also, also using fentanyl       Patient staffed with supervising physician Dr. Dutch Sullivan    \[facility] [place] [facility]\""    Lizzette Arboleda is a 55 y.o. male who presents with left thigh infection. Onset 3 days ago. Patient states area of redness started in region where he was trying to inject meth. Patient states he has history of abscess in similar location and current symptoms feel similar. Pain worsens with direct palpation and movement. No known alleviating factors. Patient denies history of diabetes. Patient denies chest pain, shortness of breath, abdominal pain, fever.       REVIEW OF SYSTEMS    Constitutional:  Denies fever  HENT:  Denies sore throat or ear pain   Respiratory:  Denies cough or shortness of breath   Cardiovascular:  Denies chest pain  GI:  Denies abdominal pain, vomiting, or diarrhea   Musculoskeletal: See HPI  Skin:  See HPI  Neurologic:  Denies focal weakness or sensory changes   Lymphatic:  Denies swollen glands     All other review of systems are negative  See HPI and nursing notes for additional information     PAST MEDICAL HISTORY    Past Medical History:   Diagnosis Date    Anxiety     Back pain     Bipolar 1 disorder ([address]     Bipolar affect, depressed ([address]     Bipolar disorder ([address]     Bladder cancer ([address]     Cancer ([address]     bladder    Depression     Hepatitis C     Herniated disc     lumbar    Osteoarthritis     Psychosis ([address]     Schizoaffective disorder ([address]     Schizophrenia ([address]     Scoliosis        SURGICAL HISTORY    Past Surgical History:   Procedure Laterality Date    BLADDER SURGERY      States had bladder cancer surgery    TUMOR REMOVAL  2008    bladder S/P cancer CURRENT MEDICATIONS    Current Outpatient Rx   Medication Sig Dispense Refill    naproxen (NAPROSYN) 500 MG tablet Take 1 tablet by mouth 2 times daily 60 tablet 0    acetaminophen (AMINOFEN) 325 MG tablet Take 2 tablets by mouth every 6 hours as needed for Pain 120 tablet 3    buprenorphine-naloxone (SUBOXONE) 8-2 MG FILM SL film Place 2 Film under the tongue daily.  gabapentin (NEURONTIN) 600 MG tablet Take 1 tablet by mouth 3 times daily for 27 doses. . 27 tablet 0    lithium 300 MG tablet Take 1 tablet by mouth 3 times daily (Patient taking differently: Take 300 mg by mouth 3 times daily Indications: 600mg in am and 300mg in pm ) 27 tablet 0    risperiDONE (RISPERDAL) 1 MG tablet Take 1 tablet by mouth three times daily (Patient taking differently: Take 1 mg by mouth 2 times daily ) 9 tablet 0    venlafaxine (EFFEXOR XR) 150 MG extended release capsule Take 1 capsule by mouth daily 9 capsule 0    gabapentin (NEURONTIN) 600 MG tablet Take 1 tablet by mouth 4 times daily. 120 tablet 1    QUEtiapine (SEROQUEL) 200 MG tablet Take 1 tablet by mouth nightly. 30 tablet 3    rOPINIRole (REQUIP) 1 MG tablet Take 1 tablet by mouth nightly. 30 tablet 3    gabapentin (NEURONTIN) 800 MG tablet Take 1 tablet by mouth 3 times daily. 90 tablet 2    venlafaxine (EFFEXOR-XR) 150 MG XR capsule Take 1 capsule by mouth daily. 30 capsule 3    lithium 300 MG capsule Take 1 capsule by mouth 2 times daily (with meals). 60 capsule 3    ranitidine (ZANTAC) 150 MG capsule Take 1 capsule by mouth 2 times daily. 60 capsule 6    rOPINIRole (REQUIP) 0.5 MG tablet Take 1 tablet by mouth every evening.  30 tablet 3       ALLERGIES    Allergies   Allergen Reactions    Codeine      Sweaty, anxious, abdominal pain       FAMILY HISTORY    Family History   Problem Relation Age of Onset    Diabetes Mother     Cancer Mother         lung    Heart Disease Mother     Seizures Mother     Seizures Sister     Cancer Maternal Uncle         lung    Cancer Maternal Grandmother         lung    Cancer Maternal Uncle         lung       SOCIAL HISTORY    Social History     Socioeconomic History    Marital status: Single     Spouse name: Jess Kong Number of children: 3    Years of education: 15    Highest education level: None   Occupational History    Occupation: unemployed   Tobacco Use    Smoking status: Current Every Day Smoker     Packs/day: 1.50     Years: 22.00     Pack years: 33.00     Types: Cigarettes    Smokeless tobacco: Never Used   Vaping Use    Vaping Use: Never used   Substance and Sexual Activity    Alcohol use: No    Drug use: Yes     Frequency: 1.0 times per week     Types: Marijuana (Weed), IV, Methamphetamines (Crystal Meth)     Comment: fentanyl    Sexual activity: Yes   Other Topics Concern    None   Social History Narrative    ** Merged History Encounter **          Social Determinants of Health     Financial Resource Strain:     Difficulty of Paying Living Expenses: Not on file   Food Insecurity:     Worried About Running Out of Food in the Last Year: Not on file    Nahed of Food in the Last Year: Not on file   Transportation Needs:     Lack of Transportation (Medical): Not on file    Lack of Transportation (Non-Medical):  Not on file   Physical Activity:     Days of Exercise per Week: Not on file    Minutes of Exercise per Session: Not on file   Stress:     Feeling of Stress : Not on file   Social Connections:     Frequency of Communication with Friends and Family: Not on file    Frequency of Social Gatherings with Friends and Family: Not on file    Attends [employer]ology Services: Not on file    Active Member of Clubs or Organizations: Not on file    Attends Club or Organization Meetings: Not on file    Marital Status: Not on file   Intimate Partner Violence:     Fear of Current or Ex-Partner: Not on file    Emotionally Abused: Not on file    Physically Abused: Not on file    Sexually Abused: Not on file   Housing Stability:     Unable to Pay for Housing in the Last Year: Not on file    Number of Mario in the Last Year: Not on file    Unstable Housing in the Last Year: Not on file       PHYSICAL EXAM    VITAL SIGNS: BP (!) 155/97   Temp 98.4 °F (36.9 °C) (Oral)   Resp 18   Ht 6' 4\" (1.93 m)   Wt 230 lb (104.3 kg)   SpO2 98%   BMI 28.00 kg/m²   Constitutional:  Well developed, well nourished  HENT:  Atraumatic, Normocephalic  Respiratory:  No respiratory distress, normal breath sounds   Cardiovascular: Tachycardic, normal rhythm, no obvious murmurs  GI:  Soft, nondistended, nontender  Musculoskeletal: Left medial thigh there are multiple scars. Moderate erythema and swelling diffusely to the medial thigh. Area is generally indurated. There is area of palpable fluctuance measuring approximately 1 cm x 1 cm and a second area of fluctuance measuring approximately 3 cm x 3 cm. Integument:  Left medial thigh there are multiple scars. Moderate erythema and swelling diffusely to the medial thigh. Area is generally indurated. There is area of palpable fluctuance measuring approximately 1 cm x 1 cm and a second area of fluctuance measuring approximately 3 cm x 3 cm. Neurological: alert and oriented, no focal deficits         ________________________________________________________________________    PROCEDURES: Incision and Drainage Procedure Note    Indication: Cutaneous Abscess    Procedure:    - Procedure explained, including risks and benefits explained to the patient who expressed understanding. All questions were answered. Verbal consent obtained. - The patient was positioned appropriately and Area was prepped and draped in the usual sterile fashion using Betadine.     - The affected area was anesthetized using 2% lidocaine using 10 mL.      - Area of greatest induration was incised utilizing a #11 blade - incision length was approximately 1 cm to more anterior abscess and 2 cm to or posterior abscess      - Wound culture was obtained and sent to lab. -Moderate amount of pus mixed with blood was expressed. Loculations were broken up using a blunt probe technique. Cavity was irrigated with sterile saline.   - Wound cavity was packed with 1/4\" iodoform gauze into larger more posterior abscess   - Wound was dressed with sterile nonstick adhesive gauze, sterile 4x4 gauze, and tape       The patient tolerated the procedure well without complication. ________________________________________________________________________      ED COURSE & MEDICAL DECISION MAKING      Patient presents as above. Patient provided IV fluids, Toradol and Norco.  Due to extent of infection, blood cultures, lactic acid and CT imaging ordered for further evaluation. IV vancomycin and clindamycin is ordered. See above procedure note for abscess incision and drainage. CBC shows elevated white blood cell count of 13.5. Remainder lab work and CT imaging pending at the end of my shift, see supervising physician note for final impression and plan. Clinical  IMPRESSION    Cellulitis and abscess of left thigh    See supervising physician note for final impression and plan    Comment: Please note this report has been produced using speech recognition software and may contain errors related to that system including errors in grammar, punctuation, and spelling, as well as words and phrases that may be inappropriate. If there are any questions or concerns please feel free to contact the dictating provider for clarification.         Jose Carey PA-C  11/30/21 5232

## 2021-11-30 NOTE — CONSULTS
PHARMACY VANCOMYCIN MONITORING & DOSING SERVICE    Ty Wood is a 55 y.o. male started on vancomycin for skin soft tissue infection. Pharmacy consulted by ED provider PAOLO Ghosh to order a dose of vancomycin in the emergency department. Other antimicrobials: clindamycin    Ht Readings from Last 1 Encounters:   11/30/21 6' 4\" (1.93 m)     Wt Readings from Last 3 Encounters:   11/30/21 230 lb (104.3 kg)   09/09/20 237 lb (107.5 kg)   08/29/20 237 lb (107.5 kg)        Pertinent Laboratory Values:   Temp Readings from Last 3 Encounters:   11/30/21 98.4 °F (36.9 °C) (Oral)   09/09/20 97.9 °F (36.6 °C) (Oral)   08/29/20 98 °F (36.7 °C) (Oral)     No results for input(s): WBC, LACTATE in the last 72 hours. No results for input(s): BUN, CREATININE in the last 72 hours. CrCl cannot be calculated (Patient's most recent lab result is older than the maximum 10 days allowed. ). No intake or output data in the 24 hours ending 11/30/21 1108    Assessment/Plan:  Pharmacy will order vancomycin 2000 mg (20 mg/kg). Please note, pharmacy will order a one-time dose of vancomycin for the Emergency Department. The consult will need to be re-ordered if vancomycin is to continue upon admission. Thank you for the consult.   Sarina Andrew, [address] [place] [address], [address] [place] [address] Tae  11/30/2021 6:54 PM

## 2021-12-01 ENCOUNTER — ANESTHESIA (OUTPATIENT)
Dept: OPERATING ROOM | Age: 46
DRG: 710 | End: 2021-12-01
Payer: COMMERCIAL

## 2021-12-01 ENCOUNTER — ANESTHESIA EVENT (OUTPATIENT)
Dept: OPERATING ROOM | Age: 46
DRG: 710 | End: 2021-12-01
Payer: COMMERCIAL

## 2021-12-01 VITALS
RESPIRATION RATE: 20 BRPM | OXYGEN SATURATION: 96 % | DIASTOLIC BLOOD PRESSURE: 88 MMHG | SYSTOLIC BLOOD PRESSURE: 126 MMHG

## 2021-12-01 PROBLEM — L02.416 ABSCESS OF LEFT THIGH: Status: ACTIVE | Noted: 2021-12-01

## 2021-12-01 LAB
ALBUMIN SERPL-MCNC: 3.4 GM/DL (ref 3.4–5)
ALP BLD-CCNC: 68 IU/L (ref 40–129)
ALT SERPL-CCNC: 31 U/L (ref 10–40)
ANION GAP SERPL CALCULATED.3IONS-SCNC: 11 MMOL/L (ref 4–16)
AST SERPL-CCNC: 18 IU/L (ref 15–37)
BASOPHILS ABSOLUTE: 0 K/CU MM
BASOPHILS RELATIVE PERCENT: 0.2 % (ref 0–1)
BILIRUB SERPL-MCNC: 0.8 MG/DL (ref 0–1)
BUN BLDV-MCNC: 12 MG/DL (ref 6–23)
CALCIUM SERPL-MCNC: 8.4 MG/DL (ref 8.3–10.6)
CHLORIDE BLD-SCNC: 106 MMOL/L (ref 99–110)
CO2: 23 MMOL/L (ref 21–32)
CREAT SERPL-MCNC: 1 MG/DL (ref 0.9–1.3)
DIFFERENTIAL TYPE: ABNORMAL
EOSINOPHILS ABSOLUTE: 0.1 K/CU MM
EOSINOPHILS RELATIVE PERCENT: 0.9 % (ref 0–3)
GFR AFRICAN AMERICAN: >60 ML/MIN/1.73M2
GFR NON-AFRICAN AMERICAN: >60 ML/MIN/1.73M2
GLUCOSE BLD-MCNC: 125 MG/DL (ref 70–99)
HCT VFR BLD CALC: 41.2 % (ref 42–52)
HEMOGLOBIN: 13.5 GM/DL (ref 13.5–18)
IMMATURE NEUTROPHIL %: 0.3 % (ref 0–0.43)
INR BLD: 0.97 INDEX
LACTATE: 1 MMOL/L (ref 0.4–2)
LYMPHOCYTES ABSOLUTE: 1.8 K/CU MM
LYMPHOCYTES RELATIVE PERCENT: 18.1 % (ref 24–44)
MCH RBC QN AUTO: 30.3 PG (ref 27–31)
MCHC RBC AUTO-ENTMCNC: 32.8 % (ref 32–36)
MCV RBC AUTO: 92.4 FL (ref 78–100)
MONOCYTES ABSOLUTE: 0.9 K/CU MM
MONOCYTES RELATIVE PERCENT: 8.7 % (ref 0–4)
NUCLEATED RBC %: 0 %
PDW BLD-RTO: 13.2 % (ref 11.7–14.9)
PLATELET # BLD: 279 K/CU MM (ref 140–440)
PMV BLD AUTO: 9.4 FL (ref 7.5–11.1)
POTASSIUM SERPL-SCNC: 3.7 MMOL/L (ref 3.5–5.1)
PROTHROMBIN TIME: 12.5 SECONDS (ref 11.7–14.5)
RBC # BLD: 4.46 M/CU MM (ref 4.6–6.2)
SARS-COV-2, NAAT: NOT DETECTED
SEGMENTED NEUTROPHILS ABSOLUTE COUNT: 7.1 K/CU MM
SEGMENTED NEUTROPHILS RELATIVE PERCENT: 71.8 % (ref 36–66)
SODIUM BLD-SCNC: 140 MMOL/L (ref 135–145)
SOURCE: NORMAL
TOTAL IMMATURE NEUTOROPHIL: 0.03 K/CU MM
TOTAL NUCLEATED RBC: 0 K/CU MM
TOTAL PROTEIN: 6.1 GM/DL (ref 6.4–8.2)
WBC # BLD: 9.9 K/CU MM (ref 4–10.5)

## 2021-12-01 PROCEDURE — 99255 IP/OBS CONSLTJ NEW/EST HI 80: CPT | Performed by: SURGERY

## 2021-12-01 PROCEDURE — 6370000000 HC RX 637 (ALT 250 FOR IP): Performed by: INTERNAL MEDICINE

## 2021-12-01 PROCEDURE — 2500000003 HC RX 250 WO HCPCS: Performed by: SURGERY

## 2021-12-01 PROCEDURE — 6360000002 HC RX W HCPCS: Performed by: NURSE ANESTHETIST, CERTIFIED REGISTERED

## 2021-12-01 PROCEDURE — 6360000002 HC RX W HCPCS: Performed by: SURGERY

## 2021-12-01 PROCEDURE — 87205 SMEAR GRAM STAIN: CPT

## 2021-12-01 PROCEDURE — 85610 PROTHROMBIN TIME: CPT

## 2021-12-01 PROCEDURE — 7100000001 HC PACU RECOVERY - ADDTL 15 MIN: Performed by: SURGERY

## 2021-12-01 PROCEDURE — 87635 SARS-COV-2 COVID-19 AMP PRB: CPT

## 2021-12-01 PROCEDURE — 6360000002 HC RX W HCPCS: Performed by: EMERGENCY MEDICINE

## 2021-12-01 PROCEDURE — 6370000000 HC RX 637 (ALT 250 FOR IP): Performed by: SURGERY

## 2021-12-01 PROCEDURE — 27301 DRAIN THIGH/KNEE LESION: CPT | Performed by: SURGERY

## 2021-12-01 PROCEDURE — 6360000002 HC RX W HCPCS: Performed by: INTERNAL MEDICINE

## 2021-12-01 PROCEDURE — 2580000003 HC RX 258: Performed by: INTERNAL MEDICINE

## 2021-12-01 PROCEDURE — 87070 CULTURE OTHR SPECIMN AEROBIC: CPT

## 2021-12-01 PROCEDURE — 3700000001 HC ADD 15 MINUTES (ANESTHESIA): Performed by: SURGERY

## 2021-12-01 PROCEDURE — 7100000000 HC PACU RECOVERY - FIRST 15 MIN: Performed by: SURGERY

## 2021-12-01 PROCEDURE — 0J9M0ZZ DRAINAGE OF LEFT UPPER LEG SUBCUTANEOUS TISSUE AND FASCIA, OPEN APPROACH: ICD-10-PCS | Performed by: SURGERY

## 2021-12-01 PROCEDURE — 2580000003 HC RX 258: Performed by: SURGERY

## 2021-12-01 PROCEDURE — 2060000000 HC ICU INTERMEDIATE R&B

## 2021-12-01 PROCEDURE — 83605 ASSAY OF LACTIC ACID: CPT

## 2021-12-01 PROCEDURE — 80053 COMPREHEN METABOLIC PANEL: CPT

## 2021-12-01 PROCEDURE — 6360000002 HC RX W HCPCS: Performed by: ANESTHESIOLOGY

## 2021-12-01 PROCEDURE — 3700000000 HC ANESTHESIA ATTENDED CARE: Performed by: SURGERY

## 2021-12-01 PROCEDURE — 85025 COMPLETE CBC W/AUTO DIFF WBC: CPT

## 2021-12-01 PROCEDURE — 3600000002 HC SURGERY LEVEL 2 BASE: Performed by: SURGERY

## 2021-12-01 PROCEDURE — 2709999900 HC NON-CHARGEABLE SUPPLY: Performed by: SURGERY

## 2021-12-01 PROCEDURE — 3600000012 HC SURGERY LEVEL 2 ADDTL 15MIN: Performed by: SURGERY

## 2021-12-01 PROCEDURE — 87075 CULTR BACTERIA EXCEPT BLOOD: CPT

## 2021-12-01 RX ORDER — VENLAFAXINE HYDROCHLORIDE 150 MG/1
150 CAPSULE, EXTENDED RELEASE ORAL DAILY
Status: DISCONTINUED | OUTPATIENT
Start: 2021-12-01 | End: 2021-12-03 | Stop reason: HOSPADM

## 2021-12-01 RX ORDER — POLYETHYLENE GLYCOL 3350 17 G/17G
17 POWDER, FOR SOLUTION ORAL DAILY PRN
Status: DISCONTINUED | OUTPATIENT
Start: 2021-12-01 | End: 2021-12-03 | Stop reason: HOSPADM

## 2021-12-01 RX ORDER — FENTANYL CITRATE 50 UG/ML
25 INJECTION, SOLUTION INTRAMUSCULAR; INTRAVENOUS EVERY 5 MIN PRN
Status: DISCONTINUED | OUTPATIENT
Start: 2021-12-01 | End: 2021-12-01 | Stop reason: HOSPADM

## 2021-12-01 RX ORDER — ACETAMINOPHEN 325 MG/1
650 TABLET ORAL EVERY 6 HOURS PRN
Status: DISCONTINUED | OUTPATIENT
Start: 2021-12-01 | End: 2021-12-03 | Stop reason: HOSPADM

## 2021-12-01 RX ORDER — ARIPIPRAZOLE 10 MG/1
10 TABLET ORAL DAILY
COMMUNITY

## 2021-12-01 RX ORDER — SODIUM CHLORIDE 0.9 % (FLUSH) 0.9 %
5-40 SYRINGE (ML) INJECTION EVERY 12 HOURS SCHEDULED
Status: DISCONTINUED | OUTPATIENT
Start: 2021-12-01 | End: 2021-12-03 | Stop reason: HOSPADM

## 2021-12-01 RX ORDER — BENZTROPINE MESYLATE 1 MG/1
1 TABLET ORAL DAILY
Status: DISCONTINUED | OUTPATIENT
Start: 2021-12-01 | End: 2021-12-03 | Stop reason: HOSPADM

## 2021-12-01 RX ORDER — DICYCLOMINE HCL 20 MG
20 TABLET ORAL EVERY 6 HOURS PRN
Status: DISCONTINUED | OUTPATIENT
Start: 2021-12-01 | End: 2021-12-03 | Stop reason: HOSPADM

## 2021-12-01 RX ORDER — MORPHINE SULFATE 2 MG/ML
2 INJECTION, SOLUTION INTRAMUSCULAR; INTRAVENOUS EVERY 5 MIN PRN
Status: DISCONTINUED | OUTPATIENT
Start: 2021-12-01 | End: 2021-12-01 | Stop reason: HOSPADM

## 2021-12-01 RX ORDER — IBUPROFEN 400 MG/1
800 TABLET ORAL EVERY 8 HOURS PRN
Status: DISCONTINUED | OUTPATIENT
Start: 2021-12-01 | End: 2021-12-03 | Stop reason: HOSPADM

## 2021-12-01 RX ORDER — RISPERIDONE 0.5 MG/1
1 TABLET, FILM COATED ORAL 2 TIMES DAILY
Status: DISCONTINUED | OUTPATIENT
Start: 2021-12-01 | End: 2021-12-03 | Stop reason: HOSPADM

## 2021-12-01 RX ORDER — HYDRALAZINE HYDROCHLORIDE 20 MG/ML
5 INJECTION INTRAMUSCULAR; INTRAVENOUS EVERY 10 MIN PRN
Status: DISCONTINUED | OUTPATIENT
Start: 2021-12-01 | End: 2021-12-01 | Stop reason: HOSPADM

## 2021-12-01 RX ORDER — PROPOFOL 10 MG/ML
INJECTION, EMULSION INTRAVENOUS PRN
Status: DISCONTINUED | OUTPATIENT
Start: 2021-12-01 | End: 2021-12-01 | Stop reason: SDUPTHER

## 2021-12-01 RX ORDER — LITHIUM CARBONATE 300 MG
600 TABLET ORAL DAILY
Status: DISCONTINUED | OUTPATIENT
Start: 2021-12-01 | End: 2021-12-03 | Stop reason: HOSPADM

## 2021-12-01 RX ORDER — BENZTROPINE MESYLATE 1 MG/1
1 TABLET ORAL DAILY
COMMUNITY

## 2021-12-01 RX ORDER — LABETALOL HYDROCHLORIDE 5 MG/ML
5 INJECTION, SOLUTION INTRAVENOUS EVERY 10 MIN PRN
Status: DISCONTINUED | OUTPATIENT
Start: 2021-12-01 | End: 2021-12-01 | Stop reason: HOSPADM

## 2021-12-01 RX ORDER — LIDOCAINE HYDROCHLORIDE 10 MG/ML
INJECTION, SOLUTION INFILTRATION; PERINEURAL
Status: COMPLETED | OUTPATIENT
Start: 2021-12-01 | End: 2021-12-01

## 2021-12-01 RX ORDER — BUPRENORPHINE 2 MG/1
2 TABLET SUBLINGUAL PRN
Status: DISCONTINUED | OUTPATIENT
Start: 2021-12-01 | End: 2021-12-03 | Stop reason: HOSPADM

## 2021-12-01 RX ORDER — ONDANSETRON 2 MG/ML
4 INJECTION INTRAMUSCULAR; INTRAVENOUS EVERY 6 HOURS PRN
Status: DISCONTINUED | OUTPATIENT
Start: 2021-12-01 | End: 2021-12-03 | Stop reason: HOSPADM

## 2021-12-01 RX ORDER — SODIUM CHLORIDE 0.9 % (FLUSH) 0.9 %
5-40 SYRINGE (ML) INJECTION PRN
Status: DISCONTINUED | OUTPATIENT
Start: 2021-12-01 | End: 2021-12-03 | Stop reason: HOSPADM

## 2021-12-01 RX ORDER — FENTANYL CITRATE 50 UG/ML
INJECTION, SOLUTION INTRAMUSCULAR; INTRAVENOUS PRN
Status: DISCONTINUED | OUTPATIENT
Start: 2021-12-01 | End: 2021-12-01 | Stop reason: SDUPTHER

## 2021-12-01 RX ORDER — HYDROMORPHONE HCL 110MG/55ML
0.5 PATIENT CONTROLLED ANALGESIA SYRINGE INTRAVENOUS EVERY 5 MIN PRN
Status: DISCONTINUED | OUTPATIENT
Start: 2021-12-01 | End: 2021-12-01 | Stop reason: HOSPADM

## 2021-12-01 RX ORDER — GABAPENTIN 300 MG/1
600 CAPSULE ORAL 4 TIMES DAILY
Status: DISCONTINUED | OUTPATIENT
Start: 2021-12-01 | End: 2021-12-03 | Stop reason: HOSPADM

## 2021-12-01 RX ORDER — SODIUM CHLORIDE 9 MG/ML
INJECTION, SOLUTION INTRAVENOUS CONTINUOUS
Status: DISCONTINUED | OUTPATIENT
Start: 2021-12-01 | End: 2021-12-03

## 2021-12-01 RX ORDER — ARIPIPRAZOLE 10 MG/1
10 TABLET ORAL DAILY
Status: DISCONTINUED | OUTPATIENT
Start: 2021-12-01 | End: 2021-12-03 | Stop reason: HOSPADM

## 2021-12-01 RX ORDER — HYDROMORPHONE HCL 110MG/55ML
0.25 PATIENT CONTROLLED ANALGESIA SYRINGE INTRAVENOUS EVERY 5 MIN PRN
Status: DISCONTINUED | OUTPATIENT
Start: 2021-12-01 | End: 2021-12-01 | Stop reason: HOSPADM

## 2021-12-01 RX ORDER — BUPRENORPHINE HYDROCHLORIDE AND NALOXONE HYDROCHLORIDE DIHYDRATE 8; 2 MG/1; MG/1
1 TABLET SUBLINGUAL ONCE
Status: COMPLETED | OUTPATIENT
Start: 2021-12-01 | End: 2021-12-01

## 2021-12-01 RX ORDER — HYDROXYZINE PAMOATE 25 MG/1
50 CAPSULE ORAL EVERY 8 HOURS PRN
Status: DISCONTINUED | OUTPATIENT
Start: 2021-12-01 | End: 2021-12-03 | Stop reason: HOSPADM

## 2021-12-01 RX ORDER — PROMETHAZINE HYDROCHLORIDE 25 MG/ML
6.25 INJECTION, SOLUTION INTRAMUSCULAR; INTRAVENOUS
Status: DISCONTINUED | OUTPATIENT
Start: 2021-12-01 | End: 2021-12-01 | Stop reason: HOSPADM

## 2021-12-01 RX ORDER — SODIUM CHLORIDE 9 MG/ML
25 INJECTION, SOLUTION INTRAVENOUS PRN
Status: DISCONTINUED | OUTPATIENT
Start: 2021-12-01 | End: 2021-12-03 | Stop reason: HOSPADM

## 2021-12-01 RX ORDER — ACETAMINOPHEN 650 MG/1
650 SUPPOSITORY RECTAL EVERY 6 HOURS PRN
Status: DISCONTINUED | OUTPATIENT
Start: 2021-12-01 | End: 2021-12-03 | Stop reason: HOSPADM

## 2021-12-01 RX ORDER — PROPRANOLOL HYDROCHLORIDE 10 MG/1
10 TABLET ORAL 3 TIMES DAILY
COMMUNITY

## 2021-12-01 RX ORDER — VANCOMYCIN 1.75 G/350ML
1250 INJECTION, SOLUTION INTRAVENOUS EVERY 12 HOURS
Status: DISCONTINUED | OUTPATIENT
Start: 2021-12-01 | End: 2021-12-02

## 2021-12-01 RX ORDER — LANOLIN ALCOHOL/MO/W.PET/CERES
3 CREAM (GRAM) TOPICAL NIGHTLY
Status: DISCONTINUED | OUTPATIENT
Start: 2021-12-01 | End: 2021-12-03 | Stop reason: HOSPADM

## 2021-12-01 RX ORDER — LIDOCAINE HYDROCHLORIDE 20 MG/ML
INJECTION, SOLUTION INTRAVENOUS PRN
Status: DISCONTINUED | OUTPATIENT
Start: 2021-12-01 | End: 2021-12-01 | Stop reason: SDUPTHER

## 2021-12-01 RX ORDER — CLONIDINE HYDROCHLORIDE 0.1 MG/1
0.1 TABLET ORAL PRN
Status: DISCONTINUED | OUTPATIENT
Start: 2021-12-01 | End: 2021-12-03 | Stop reason: HOSPADM

## 2021-12-01 RX ORDER — PROPRANOLOL HYDROCHLORIDE 10 MG/1
10 TABLET ORAL 3 TIMES DAILY
Status: DISCONTINUED | OUTPATIENT
Start: 2021-12-01 | End: 2021-12-03 | Stop reason: HOSPADM

## 2021-12-01 RX ORDER — LITHIUM CARBONATE 300 MG/1
300 CAPSULE ORAL NIGHTLY
Status: DISCONTINUED | OUTPATIENT
Start: 2021-12-01 | End: 2021-12-03 | Stop reason: HOSPADM

## 2021-12-01 RX ORDER — ONDANSETRON 4 MG/1
4 TABLET, ORALLY DISINTEGRATING ORAL EVERY 8 HOURS PRN
Status: DISCONTINUED | OUTPATIENT
Start: 2021-12-01 | End: 2021-12-03 | Stop reason: HOSPADM

## 2021-12-01 RX ADMIN — SODIUM CHLORIDE: 9 INJECTION, SOLUTION INTRAVENOUS at 02:35

## 2021-12-01 RX ADMIN — PIPERACILLIN SODIUM AND TAZOBACTAM SODIUM 3375 MG: 3; .375 INJECTION, POWDER, LYOPHILIZED, FOR SOLUTION INTRAVENOUS at 12:37

## 2021-12-01 RX ADMIN — PROPRANOLOL HYDROCHLORIDE 10 MG: 10 TABLET ORAL at 17:07

## 2021-12-01 RX ADMIN — HYDROMORPHONE HYDROCHLORIDE 0.5 MG: 2 INJECTION INTRAMUSCULAR; INTRAVENOUS; SUBCUTANEOUS at 14:23

## 2021-12-01 RX ADMIN — RISPERIDONE 1 MG: 0.5 TABLET ORAL at 22:51

## 2021-12-01 RX ADMIN — SODIUM CHLORIDE: 9 INJECTION, SOLUTION INTRAVENOUS at 13:43

## 2021-12-01 RX ADMIN — CLONIDINE HYDROCHLORIDE 0.1 MG: 0.1 TABLET ORAL at 07:11

## 2021-12-01 RX ADMIN — PROPRANOLOL HYDROCHLORIDE 10 MG: 10 TABLET ORAL at 22:53

## 2021-12-01 RX ADMIN — LITHIUM CARBONATE 300 MG: 300 CAPSULE, GELATIN COATED ORAL at 02:32

## 2021-12-01 RX ADMIN — FENTANYL CITRATE 25 MCG: 50 INJECTION, SOLUTION INTRAMUSCULAR; INTRAVENOUS at 13:29

## 2021-12-01 RX ADMIN — FENTANYL CITRATE 50 MCG: 50 INJECTION, SOLUTION INTRAMUSCULAR; INTRAVENOUS at 13:15

## 2021-12-01 RX ADMIN — GABAPENTIN 600 MG: 300 CAPSULE ORAL at 22:52

## 2021-12-01 RX ADMIN — GABAPENTIN 600 MG: 300 CAPSULE ORAL at 17:06

## 2021-12-01 RX ADMIN — BUPRENORPHINE 2 MG: 2 TABLET SUBLINGUAL at 07:11

## 2021-12-01 RX ADMIN — FENTANYL CITRATE 25 MCG: 50 INJECTION, SOLUTION INTRAMUSCULAR; INTRAVENOUS at 13:38

## 2021-12-01 RX ADMIN — BUPRENORPHINE HYDROCHLORIDE AND NALOXONE HYDROCHLORIDE DIHYDRATE 1 TABLET: 8; 2 TABLET SUBLINGUAL at 04:28

## 2021-12-01 RX ADMIN — PIPERACILLIN SODIUM AND TAZOBACTAM SODIUM 3375 MG: 3; .375 INJECTION, POWDER, LYOPHILIZED, FOR SOLUTION INTRAVENOUS at 22:56

## 2021-12-01 RX ADMIN — LITHIUM CARBONATE 300 MG: 300 CAPSULE, GELATIN COATED ORAL at 22:52

## 2021-12-01 RX ADMIN — PIPERACILLIN SODIUM AND TAZOBACTAM SODIUM 3375 MG: 3; .375 INJECTION, POWDER, LYOPHILIZED, FOR SOLUTION INTRAVENOUS at 02:33

## 2021-12-01 RX ADMIN — SODIUM CHLORIDE: 9 INJECTION, SOLUTION INTRAVENOUS at 14:23

## 2021-12-01 RX ADMIN — LIDOCAINE HYDROCHLORIDE 100 MG: 20 INJECTION, SOLUTION INTRAVENOUS at 13:18

## 2021-12-01 RX ADMIN — RISPERIDONE 1 MG: 0.5 TABLET ORAL at 02:31

## 2021-12-01 RX ADMIN — IBUPROFEN 800 MG: 400 TABLET ORAL at 02:31

## 2021-12-01 RX ADMIN — Medication 3 MG: at 22:52

## 2021-12-01 RX ADMIN — Medication 3 MG: at 02:31

## 2021-12-01 RX ADMIN — PROPOFOL 450 MG: 10 INJECTION, EMULSION INTRAVENOUS at 13:15

## 2021-12-01 RX ADMIN — ACETAMINOPHEN 650 MG: 325 TABLET ORAL at 17:07

## 2021-12-01 ASSESSMENT — PULMONARY FUNCTION TESTS
PIF_VALUE: 0
PIF_VALUE: 1
PIF_VALUE: 0
PIF_VALUE: 1
PIF_VALUE: 0
PIF_VALUE: 1
PIF_VALUE: 0
PIF_VALUE: 1
PIF_VALUE: 0
PIF_VALUE: 1
PIF_VALUE: 0
PIF_VALUE: 1
PIF_VALUE: 1
PIF_VALUE: 0
PIF_VALUE: 1
PIF_VALUE: 0

## 2021-12-01 ASSESSMENT — PAIN DESCRIPTION - LOCATION
LOCATION: LEG
LOCATION: LEG

## 2021-12-01 ASSESSMENT — PAIN SCALES - GENERAL
PAINLEVEL_OUTOF10: 7
PAINLEVEL_OUTOF10: 5
PAINLEVEL_OUTOF10: 5
PAINLEVEL_OUTOF10: 7
PAINLEVEL_OUTOF10: 7

## 2021-12-01 ASSESSMENT — PAIN DESCRIPTION - DESCRIPTORS
DESCRIPTORS: BURNING;CONSTANT
DESCRIPTORS: ACHING;BURNING

## 2021-12-01 ASSESSMENT — PAIN DESCRIPTION - PAIN TYPE
TYPE: SURGICAL PAIN
TYPE: SURGICAL PAIN

## 2021-12-01 ASSESSMENT — LIFESTYLE VARIABLES: SMOKING_STATUS: 1

## 2021-12-01 ASSESSMENT — PAIN DESCRIPTION - FREQUENCY: FREQUENCY: CONTINUOUS

## 2021-12-01 ASSESSMENT — PAIN DESCRIPTION - ORIENTATION
ORIENTATION: LEFT
ORIENTATION: LEFT

## 2021-12-01 ASSESSMENT — PAIN - FUNCTIONAL ASSESSMENT: PAIN_FUNCTIONAL_ASSESSMENT: 0-10

## 2021-12-01 NOTE — ANESTHESIA POSTPROCEDURE EVALUATION
Department of Anesthesiology  Postprocedure Note    Patient: Jody Maurer  MRN: [id number]  YOB: 1975  Date of evaluation: 12/1/2021  Time:  2:04 PM     Procedure Summary     Date: 12/01/21 Room / Location: [address] [place] [address]    Anesthesia Start: 1311 Anesthesia Stop: 0271    Procedure: LEFT LEG DEBRIDEMENT INCISION AND DRAINAGE (Left ) Diagnosis: (LEFT LOWER LEG PAIN)    Surgeons: Sonya Reyes MD Responsible Provider: Meche Olmstead MD    Anesthesia Type: MAC ASA Status: 3 - Emergent          Anesthesia Type: MAC    Ortega Phase I:      Ortega Phase II:      Last vitals: Reviewed and per EMR flowsheets.        Anesthesia Post Evaluation    Patient location during evaluation: PACU  Patient participation: complete - patient participated  Level of consciousness: awake and alert  Pain score: 6  Airway patency: patent  Nausea & Vomiting: no vomiting and no nausea  Complications: no  Cardiovascular status: blood pressure returned to baseline and hemodynamically stable  Respiratory status: acceptable, room air, spontaneous ventilation and nonlabored ventilation  Hydration status: stable

## 2021-12-01 NOTE — ANESTHESIA PRE PROCEDURE
Department of Anesthesiology  Preprocedure Note       Name:  Lorene Sandhu   Age:  55 y.o.  :  1975                                          MRN:  [id number]         Date:  2021      Surgeon: Luis Felipe Tavarez):  Marvin Munoz MD    Procedure: Procedure(s):  LEFT LOWER LEG DEBRIDEMENT INCISION AND DRAINAGE    Medications prior to admission:   Prior to Admission medications    Medication Sig Start Date End Date Taking? Authorizing Provider   ARIPiprazole (ABILIFY) 10 MG tablet Take 10 mg by mouth daily    Historical Provider, MD   benztropine (COGENTIN) 1 MG tablet Take 1 mg by mouth daily    Historical Provider, MD   cariprazine hcl (VRAYLAR) 4.5 MG CAPS capsule Take 4.5 mg by mouth daily     Historical Provider, MD   propranolol (INDERAL) 10 MG tablet Take 10 mg by mouth 3 times daily    Historical Provider, MD   naproxen (NAPROSYN) 500 MG tablet Take 1 tablet by mouth 2 times daily 10/5/19   Ramona Gonsalez MD   acetaminophen (AMINOFEN) 325 MG tablet Take 2 tablets by mouth every 6 hours as needed for Pain 10/5/19   Ramona Gonsalez MD   buprenorphine-naloxone (SUBOXONE) 8-2 MG FILM SL film Place 2 Film under the tongue daily. Historical Provider, MD   lithium 300 MG tablet Take 1 tablet by mouth 3 times daily  Patient taking differently: Take 300 mg by mouth 3 times daily Indications: 600mg in am and 300mg in pm  19   Ralph Worthington DO   risperiDONE (RISPERDAL) 1 MG tablet Take 1 tablet by mouth three times daily  Patient taking differently: Take 1 mg by mouth 2 times daily  19   Ralph Worthington DO   gabapentin (NEURONTIN) 600 MG tablet Take 1 tablet by mouth 4 times daily. 13   Ross Schwartz MD   venlafaxine (EFFEXOR-XR) 150 MG XR capsule Take 1 capsule by mouth daily. 4/15/13   Ross Schwartz MD       Current medications:    No current facility-administered medications for this visit. No current outpatient medications on file.      Facility-Administered Medications Ordered in Other Visits   Medication Dose Route Frequency Provider Last Rate Last Admin    sodium chloride flush 0.9 % injection 5-40 mL  5-40 mL IntraVENous 2 times per day Caprice Kaiser MD        sodium chloride flush 0.9 % injection 5-40 mL  5-40 mL IntraVENous PRN Caprice Kaiser MD        0.9 % sodium chloride infusion  25 mL IntraVENous PRN Caprice Kaiser MD        ondansetron (ZOFRAN-ODT) disintegrating tablet 4 mg  4 mg Oral Q8H PRN Caprice Kaiser MD        Or    ondansetron (ZOFRAN) injection 4 mg  4 mg IntraVENous Q6H PRN Caprice Kaiser MD        polyethylene glycol (GLYCOLAX) packet 17 g  17 g Oral Daily PRN Caprice Kaiser MD        acetaminophen (TYLENOL) tablet 650 mg  650 mg Oral Q6H PRN Caprice Kaiser MD        Or    acetaminophen (TYLENOL) suppository 650 mg  650 mg Rectal Q6H PRTAL Kaiser MD        piperacillin-tazobactam (ZOSYN) 3,375 mg in dextrose 5 % 50 mL IVPB (mini-bag)  3,375 mg IntraVENous Q6H Caprice Kaiser  mL/hr at 12/01/21 1237 3,375 mg at 12/01/21 1237    buprenorphine (SUBUTEX) SL tablet 2 mg  2 mg SubLINGual PRTAL Kaiser MD   2 mg at 12/01/21 2021    And    cloNIDine (CATAPRES) tablet 0.1 mg  0.1 mg Oral MALISSA Kaiser MD   0.1 mg at 12/01/21 0711    dicyclomine (BENTYL) tablet 20 mg  20 mg Oral Q6H PRN Caprice Kaiser MD        melatonin tablet 3 mg  3 mg Oral Nightly Caprice Kaiser MD   3 mg at 12/01/21 0231    ibuprofen (ADVIL;MOTRIN) tablet 800 mg  800 mg Oral Q8H PRN Caprice Kaiser MD   800 mg at 12/01/21 0231    hydrOXYzine (VISTARIL) capsule 50 mg  50 mg Oral Q8H PRN Caprice Kaiser MD        ARIPiprazole (ABILIFY) tablet 10 mg  10 mg Oral Daily Caprice Kaiser MD        benztropine (COGENTIN) tablet 1 mg  1 mg Oral Daily Caprice Kaiser MD        cariprazine hcl (VRAYLAR) capsule 4.5 mg  4.5 mg Oral Daily Caprice Kaiser MD        gabapentin (NEURONTIN) capsule 600 mg  600 mg Oral 4x daily Holly May MD        lithium tablet 600 mg  600 mg Oral Daily Holly May MD        lithium capsule 300 mg  300 mg Oral Nightly Holly May MD   300 mg at 12/01/21 0232    propranolol (INDERAL) tablet 10 mg  10 mg Oral TID Holly May MD        risperiDONE (RISPERDAL) tablet 1 mg  1 mg Oral BID Holly May MD   1 mg at 12/01/21 0231    venlafaxine (EFFEXOR XR) extended release capsule 150 mg  150 mg Oral Daily Holly May MD        vancomycin (VANCOCIN) 1250 mg in 250 mL IVPB  1,250 mg IntraVENous Q12H Holly May MD        0.9 % sodium chloride infusion   IntraVENous Continuous Holly May  mL/hr at 12/01/21 0235 New Bag at 12/01/21 0235       Allergies:     Allergies   Allergen Reactions    Codeine      Sweaty, anxious, abdominal pain       Problem List:    Patient Active Problem List   Diagnosis Code    Bipolar affective disorder ([address] F31.9    Osteoarthritis of lumbar spine M47.816    Heavy smoker (more than 20 cigarettes per day) F17.210    Cannabis abuse F12.10     history of bladder cancer C67.9    Restless leg syndrome G25.81    Abscess of left thigh L02.416       Past Medical History:        Diagnosis Date    Anxiety     Back pain     Bipolar 1 disorder ([address]     Bipolar affect, depressed ([address]     Bipolar disorder ([address]     Bladder cancer ([address]     Cancer ([address]     bladder    Depression     Hepatitis C     Herniated disc     lumbar    Osteoarthritis     Psychosis ([address]     Schizoaffective disorder ([address]     Schizophrenia ([address]     Scoliosis        Past Surgical History:        Procedure Laterality Date    BLADDER SURGERY      States had bladder cancer surgery    TUMOR REMOVAL  2008    bladder S/P cancer       Social History:    Social History     Tobacco Use    Smoking status: Current Every Day Smoker     Packs/day: 1.50     Years: 22.00     Pack years: 33.00     Types: Cigarettes    Smokeless tobacco: Never Used   Substance Use Topics    Alcohol use: No                                Ready to quit: Not Answered  Counseling given: Not Answered      Vital Signs (Current): There were no vitals filed for this visit. BP Readings from Last 3 Encounters:   12/01/21 129/89   09/09/20 (!) 142/116   08/29/20 124/87       NPO Status:                                                                                 BMI:   Wt Readings from Last 3 Encounters:   11/30/21 230 lb (104.3 kg)   09/09/20 237 lb (107.5 kg)   08/29/20 237 lb (107.5 kg)     There is no height or weight on file to calculate BMI.    CBC:   Lab Results   Component Value Date    WBC 9.9 12/01/2021    RBC 4.46 12/01/2021    HGB 13.5 12/01/2021    HCT 41.2 12/01/2021    MCV 92.4 12/01/2021    RDW 13.2 12/01/2021     12/01/2021       CMP:   Lab Results   Component Value Date     12/01/2021    K 3.7 12/01/2021     12/01/2021    CO2 23 12/01/2021    BUN 12 12/01/2021    CREATININE 1.0 12/01/2021    GFRAA >60 12/01/2021    LABGLOM >60 12/01/2021    LABGLOM >90 10/20/2012    GLUCOSE 125 12/01/2021    PROT 6.1 12/01/2021    CALCIUM 8.4 12/01/2021    BILITOT 0.8 12/01/2021    ALKPHOS 68 12/01/2021    AST 18 12/01/2021    ALT 31 12/01/2021       POC Tests: No results for input(s): POCGLU, POCNA, POCK, POCCL, POCBUN, POCHEMO, POCHCT in the last 72 hours.     Coags:   Lab Results   Component Value Date    PROTIME 12.5 12/01/2021    INR 0.97 12/01/2021    APTT 32.7 09/09/2020       HCG (If Applicable): No results found for: PREGTESTUR, PREGSERUM, HCG, HCGQUANT     ABGs: No results found for: PHART, PO2ART, UMC9KMI, LYQ3BJM, BEART, G7MBWSDN     Type & Screen (If Applicable):  Lab Results   Component Value Date    LABRH NEG 10/20/2012       Drug/Infectious Status (If Applicable):  No results found for: HIV, HEPCAB    COVID-19 Screening (If Applicable): Lab Results   Component Value Date    COVID19 NOT DETECTED 12/01/2021           Anesthesia Evaluation  Patient summary reviewed  Airway: Mallampati: II  TM distance: >3 FB   Neck ROM: full  Mouth opening: > = 3 FB Dental:      Comment: Poor dentition. Nothing loose per patient    Pulmonary:normal exam    (+) current smoker          Patient did not smoke on day of surgery. ROS comment: thc meth    Cardiovascular:  Exercise tolerance: good (>4 METS),           Rhythm: regular  Rate: normal                    Neuro/Psych:   (+) neuromuscular disease:, psychiatric history:depression/anxiety              ROS comment: ivda etoh  GI/Hepatic/Renal:   (+) hepatitis: C, liver disease:,           Endo/Other:    (+) : arthritis: OA., malignancy/cancer. ROS comment: Hx bladder ca Abdominal:   (+) obese,           Vascular: negative vascular ROS. Other Findings:               Anesthesia Plan      MAC     ASA 3 - emergent     (Chart review only 12/1/21  covid pending )  Induction: intravenous. Anesthetic plan and risks discussed with patient. Plan discussed with CRNA.                   ANA M Choudhury - CRNA   12/1/2021

## 2021-12-01 NOTE — ANESTHESIA PRE PROCEDURE
Department of Anesthesiology  Preprocedure Note       Name:  Dariusz Alvarado   Age:  55 y.o.  :  1975                                          MRN:  [id number]         Date:  2021      Surgeon: Thomas Proctor):  Bety Zapata MD    Procedure: Procedure(s):  LEFT LOWER LEG DEBRIDEMENT INCISION AND DRAINAGE    Medications prior to admission:   Prior to Admission medications    Medication Sig Start Date End Date Taking? Authorizing Provider   ARIPiprazole (ABILIFY) 10 MG tablet Take 10 mg by mouth daily   Yes Historical Provider, MD   benztropine (COGENTIN) 1 MG tablet Take 1 mg by mouth daily   Yes Historical Provider, MD   cariprazine hcl (VRAYLAR) 4.5 MG CAPS capsule Take 4.5 mg by mouth daily    Yes Historical Provider, MD   propranolol (INDERAL) 10 MG tablet Take 10 mg by mouth 3 times daily   Yes Historical Provider, MD   naproxen (NAPROSYN) 500 MG tablet Take 1 tablet by mouth 2 times daily 10/5/19   Dwana Nissen, MD   acetaminophen (AMINOFEN) 325 MG tablet Take 2 tablets by mouth every 6 hours as needed for Pain 10/5/19   Dwana Nissen, MD   buprenorphine-naloxone (SUBOXONE) 8-2 MG FILM SL film Place 2 Film under the tongue daily. Historical Provider, MD   lithium 300 MG tablet Take 1 tablet by mouth 3 times daily  Patient taking differently: Take 300 mg by mouth 3 times daily Indications: 600mg in am and 300mg in pm  19   Suman Meyer DO   risperiDONE (RISPERDAL) 1 MG tablet Take 1 tablet by mouth three times daily  Patient taking differently: Take 1 mg by mouth 2 times daily  19   Suman Meyer DO   gabapentin (NEURONTIN) 600 MG tablet Take 1 tablet by mouth 4 times daily. 13   Estephania Reich MD   venlafaxine (EFFEXOR-XR) 150 MG XR capsule Take 1 capsule by mouth daily.  4/15/13   Estephania Reich MD       Current medications:    Current Facility-Administered Medications   Medication Dose Route Frequency Provider Last Rate Last Admin    sodium chloride flush 0.9 % injection 5-40 mL 5-40 mL IntraVENous 2 times per day Flaca Mccann MD        sodium chloride flush 0.9 % injection 5-40 mL  5-40 mL IntraVENous PRN Flaca Mccann MD        0.9 % sodium chloride infusion  25 mL IntraVENous PRN Flaca Mccann MD        ondansetron (ZOFRAN-ODT) disintegrating tablet 4 mg  4 mg Oral Q8H PRN Flaca Mccann MD        Or    ondansetron (ZOFRAN) injection 4 mg  4 mg IntraVENous Q6H PRN Flaca Mccann MD        polyethylene glycol (GLYCOLAX) packet 17 g  17 g Oral Daily PRN Flaca Mccann MD        acetaminophen (TYLENOL) tablet 650 mg  650 mg Oral Q6H PRN Flaca Mccann MD        Or   Reynaldo Blair acetaminophen (TYLENOL) suppository 650 mg  650 mg Rectal Q6H PRN Flaca Mccann MD        piperacillin-tazobactam (ZOSYN) 3,375 mg in dextrose 5 % 50 mL IVPB (mini-bag)  3,375 mg IntraVENous Q6H Flaca Mccann MD   Stopped at 12/01/21 0303    buprenorphine (SUBUTEX) SL tablet 2 mg  2 mg SubLINGual PRN Flaca Mccann MD   2 mg at 12/01/21 2925    And    cloNIDine (CATAPRES) tablet 0.1 mg  0.1 mg Oral PRN Flaca Mccann MD   0.1 mg at 12/01/21 0711    dicyclomine (BENTYL) tablet 20 mg  20 mg Oral Q6H PRN Flaca Mccann MD        melatonin tablet 3 mg  3 mg Oral Nightly Flaca Mccann MD   3 mg at 12/01/21 0231    ibuprofen (ADVIL;MOTRIN) tablet 800 mg  800 mg Oral Q8H PRN Flaca Mccann MD   800 mg at 12/01/21 0231    hydrOXYzine (VISTARIL) capsule 50 mg  50 mg Oral Q8H PRN Flaca Mccann MD        ARIPiprazole (ABILIFY) tablet 10 mg  10 mg Oral Daily Flaca Mccann MD        benztropine (COGENTIN) tablet 1 mg  1 mg Oral Daily Flaca Mccann MD        cariprazine hcl (VRAYLAR) capsule 4.5 mg  4.5 mg Oral Daily Flaca Mccann MD        gabapentin (NEURONTIN) capsule 600 mg  600 mg Oral 4x daily Flaca Mccann MD        lithium tablet 600 mg  600 mg Oral Daily MD Reynaldo Avendaño lithium capsule 300 mg  300 mg Oral Nightly Flaca Mccann MD   300 mg at 12/01/21 0232    propranolol (INDERAL) tablet 10 mg  10 mg Oral TID Flaca Mccann MD        risperiDONE (RISPERDAL) tablet 1 mg  1 mg Oral BID Flaca Mccann MD   1 mg at 12/01/21 0231    venlafaxine (EFFEXOR XR) extended release capsule 150 mg  150 mg Oral Daily Flaca Mccann MD        vancomycin (VANCOCIN) 1250 mg in 250 mL IVPB  1,250 mg IntraVENous Q12H Flaca Mccann MD        0.9 % sodium chloride infusion   IntraVENous Continuous Flaca Mccann  mL/hr at 12/01/21 0235 New Bag at 12/01/21 0235     Current Outpatient Medications   Medication Sig Dispense Refill    ARIPiprazole (ABILIFY) 10 MG tablet Take 10 mg by mouth daily      benztropine (COGENTIN) 1 MG tablet Take 1 mg by mouth daily      cariprazine hcl (VRAYLAR) 4.5 MG CAPS capsule Take 4.5 mg by mouth daily       propranolol (INDERAL) 10 MG tablet Take 10 mg by mouth 3 times daily      naproxen (NAPROSYN) 500 MG tablet Take 1 tablet by mouth 2 times daily 60 tablet 0    acetaminophen (AMINOFEN) 325 MG tablet Take 2 tablets by mouth every 6 hours as needed for Pain 120 tablet 3    buprenorphine-naloxone (SUBOXONE) 8-2 MG FILM SL film Place 2 Film under the tongue daily.  lithium 300 MG tablet Take 1 tablet by mouth 3 times daily (Patient taking differently: Take 300 mg by mouth 3 times daily Indications: 600mg in am and 300mg in pm ) 27 tablet 0    risperiDONE (RISPERDAL) 1 MG tablet Take 1 tablet by mouth three times daily (Patient taking differently: Take 1 mg by mouth 2 times daily ) 9 tablet 0    gabapentin (NEURONTIN) 600 MG tablet Take 1 tablet by mouth 4 times daily. 120 tablet 1    venlafaxine (EFFEXOR-XR) 150 MG XR capsule Take 1 capsule by mouth daily. 30 capsule 3       Allergies:     Allergies   Allergen Reactions    Codeine      Sweaty, anxious, abdominal pain       Problem List:    Patient Active Problem List   Diagnosis Code    Bipolar affective disorder ([facility] F31.9    Osteoarthritis of lumbar spine M47.816    Heavy smoker (more than 20 cigarettes per day) F17.210    Cannabis abuse F12.10     history of bladder cancer C67.9    Restless leg syndrome G25.81    Abscess of left thigh L02.416       Past Medical History:        Diagnosis Date    Anxiety     Back pain     Bipolar [facility] disorder ([facility]     [facility], [facility]     B[facility]     Bladder cancer ([facility]     Cancer ([facility]     bladder    Depression     Hepatitis C     Herniated disc     lumbar    Osteoarthritis     Psychosis ([facility]     Schizoaffective disorder ([facility]     Schizophrenia ([facility]     Scoliosis        Past Surgical History:        Procedure Laterality Date    BLADDER SURGERY      States had bladder cancer surgery    TUMOR REMOVAL  2008    bladder S/P cancer       Social History:    Social History     Tobacco Use    Smoking status: Current Every Day Smoker     Packs/day: 1.50     Years: 22.00     Pack years: 33.00     Types: Cigarettes    Smokeless tobacco: Never Used   Substance Use Topics    Alcohol use: No                                Ready to quit: Not Answered  Counseling given: Not Answered      Vital Signs (Current):   Vitals:    12/01/21 0730 12/01/21 0800 12/01/21 0830 12/01/21 0900   BP:  (!) 136/94     Pulse: 93 82 84 85   Resp:  17     Temp:       TempSrc:       SpO2: 100% 100% 97% 98%   Weight:       Height:                                                  BP Readings from Last 3 Encounters:   12/01/21 (!) 136/94   09/09/20 (!) 142/116   08/29/20 124/87       NPO Status:                                                                                 BMI:   Wt Readings from Last 3 Encounters:   11/30/21 230 lb (104.3 kg)   09/09/20 237 lb (107.5 kg)   08/29/20 237 lb (107.5 kg)     Body mass index is 28 kg/m².     CBC:   Lab Results   Component Value Date    WBC 9.9 12/01/2021    RBC 4.46 12/01/2021 HGB 13.5 12/01/2021    HCT 41.2 12/01/2021    MCV 92.4 12/01/2021    RDW 13.2 12/01/2021     12/01/2021       CMP:   Lab Results   Component Value Date     12/01/2021    K 3.7 12/01/2021     12/01/2021    CO2 23 12/01/2021    BUN 12 12/01/2021    CREATININE 1.0 12/01/2021    GFRAA >60 12/01/2021    LABGLOM >60 12/01/2021    LABGLOM >90 10/20/2012    GLUCOSE 125 12/01/2021    PROT 6.1 12/01/2021    CALCIUM 8.4 12/01/2021    BILITOT 0.8 12/01/2021    ALKPHOS 68 12/01/2021    AST 18 12/01/2021    ALT 31 12/01/2021       POC Tests: No results for input(s): POCGLU, POCNA, POCK, POCCL, POCBUN, POCHEMO, POCHCT in the last 72 hours. Coags:   Lab Results   Component Value Date    PROTIME 12.5 12/01/2021    INR 0.97 12/01/2021    APTT 32.7 09/09/2020       HCG (If Applicable): No results found for: PREGTESTUR, PREGSERUM, HCG, HCGQUANT     ABGs: No results found for: PHART, PO2ART, OKE2CWJ, PAV0GUH, BEART, Q1SZXZSO     Type & Screen (If Applicable):  Lab Results   Component Value Date    LABRH NEG 10/20/2012       Drug/Infectious Status (If Applicable):  No results found for: HIV, HEPCAB    COVID-19 Screening (If Applicable): No results found for: COVID19        Anesthesia Evaluation  Patient summary reviewed  Airway:         Dental:          Pulmonary:   (+) current smoker                          ROS comment: thc meth    Cardiovascular:                      Neuro/Psych:   (+) neuromuscular disease:, psychiatric history:depression/anxiety              ROS comment: ivda etoh  GI/Hepatic/Renal:   (+) hepatitis: C, liver disease:,           Endo/Other:    (+) : arthritis: OA., malignancy/cancer. ROS comment: Hx bladder ca Abdominal:             Vascular: Other Findings:             Anesthesia Plan      MAC     ASA 3 - emergent     (Chart review only 12/1/21  covid pending )  Induction: intravenous.                         Barnie Corn, APRN - CINDI   12/1/2021

## 2021-12-01 NOTE — PROGRESS NOTES
[address] [facility]  consulted by Dr. Mendel Gola for monitoring and adjustment. Indication for treatment: Abscess of left thigh  Goal trough: 10-15 mcg/mL  AUC/MACKENZIE: < 500    Pertinent Laboratory Values:   Temp Readings from Last 3 Encounters:   11/30/21 98.4 °F (36.9 °C) (Oral)   09/09/20 97.9 °F (36.6 °C) (Oral)   08/29/20 98 °F (36.7 °C) (Oral)     Recent Labs     11/30/21 2020   WBC 13.5*   LACTATE 2.1*     Recent Labs     11/30/21 2020   BUN 13   CREATININE 0.9     Estimated Creatinine Clearance: 136 mL/min (based on SCr of 0.9 mg/dL). No intake or output data in the 24 hours ending 12/01/21 0053    Pertinent Cultures:  Date    Source    Results  11/30   Wound    Pending  11/30   Blood    Ordered    Vancomycin level:   TROUGH:  No results for input(s): VANCOTROUGH in the last 72 hours. RANDOM:  No results for input(s): VANCORANDOM in the last 72 hours. Assessment:  WBC and temperature: elevated WBC/afebrile  SCr, BUN, and urine output: WNL  History of IVDU  Day(s) of therapy:1  Vancomycin concentration:Pending    Plan:  Received vancomycin 2000 mg x 1 in the ED  Start vancomycin 1250 mg IVPB q12h  Predicted trough of 14.3 and AUC: 461  Pharmacy will continue to monitor patient and adjust therapy as indicated    M[place] 3 12/02/21 @0600    Thank you for the consult.   Ignacia Alonzo [facility]  12/1/2021 12:53 AM

## 2021-12-01 NOTE — OP NOTE
Incision and Drainage of Procedure Note    Patient ID:  Chuy Horta  [id number]  55 y.o.  1975    Indications: Abscess of left leg from iv drug injection two sites. Pre-operative Diagnosis: Abscess of left leg x2    Post-operative Diagnosis: same    Procedure: Incision and Drainage of left leg I & D x2 into the fascia    Surgeon: Jessi Fernandes MD , MD    Findings:  same    Estimated Blood Loss:  Minimal          Complications:  None; patient tolerated the procedure well. Condition: stable      Procedure Details: The patient was positioned appropriately and the skin over the left thigh was prepped with betadine and draped in a sterile fashion. Local anesthesia was obtained by infiltration using 1% Lidocaine without epinephrine. An incision was then made over the two sites anterior and medial thigh and approximately 10 cc of thick, tenacious, mucoid and foul smelling material was expressed. Loculations were broken up using a hemostat and more of the material was able to be expressed. This was deep into the fascial level. Wound was cultured with a swab and sent to lab. Penrose drain was placed into both areas. The drainage cavity was then packed with 1/4 inch iodoform gauze and covered with sterile dressing. Wound care instructions given. The patient tolerated the procedure well.     Complications: None    Jessi Fernandes MD

## 2021-12-01 NOTE — PROGRESS NOTES
1400 - transferred from OR on bed, monitor applied, alarms on and verified bedside handoff provided by Jeff Orozco RN, Beto Walker, and Yesika CRNA  6109 - Kerlix wrap covering dressing rewrapped due to movement  1423 - medicated for complaint of surgical pain/discomfort  1440 - phase one care complete; awaiting room assignment to transfer patient

## 2021-12-01 NOTE — H&P
medications with patient-is on Abilify 10 mg daily, benztropine 1 mg daily, Vraylar 4.5 mg daily, propranolol 10 mg 3 times daily, Suboxone, lithium 600 mg daily a.m., 300 mg daily p.m., risperidone 1 mg twice daily, gabapentin 600 mg 4 times a day, venlafaxine 150 mg daily. No known drug allergies. Codeine is listed as allergy in EMR-patient reports that he tolerates it well.        PAST MEDICAL, SURGICAL, FAMILY, and SOCIAL HISTORY         Past Medical History:   Diagnosis Date    Anxiety     Back pain     Bipolar 1 disorder ([address]     [facility] affect[facility] depressed [facility]     Bipolar disorder ([facility]     Bladder cancer ([facility]     Cancer ([facility]     bladder    Depression     Hepatitis C     Herniated disc     lumbar    Osteoarthritis     Psychosis ([facility]     Schizoaffective disorder ([facility]     Schizophrenia ([facility]     Scoliosis      Past Surgical History:   Procedure Laterality Date    BLADDER SURGERY      States had bladder cancer surgery    TUMOR REMOVAL  2008    bladder S/P cancer     Family History   Problem Relation Age of Onset    Diabetes Mother     Cancer Mother         lung    Heart Disease Mother     Seizures Mother     Seizures Sister     Cancer Maternal Uncle         lung    Cancer Maternal Grandmother         lung    Cancer Maternal Uncle         lung     Family Hx of HTN  Family Hx as reviewed above, otherwise non-contributory  Social History     Socioeconomic History    Marital status: Single     Spouse name: Angélica Robert Number of children: 3    Years of education: 15    Highest education level: None   Occupational History    Occupation: unemployed   Tobacco Use    Smoking status: Current Every Day Smoker     Packs/day: 1.50     Years: 22.00     Pack years: 33.00     Types: Cigarettes    Smokeless tobacco: Never Used   Vaping Use    Vaping Use: Never used   Substance and Sexual Activity    Alcohol use: No    Drug use: Yes     Frequency: 1.0 times per week     Types: Marijuana [place]if What[place]), IV, Methamphetamines (Crystal Meth)     Comment: fentanyl    Sexual activity: Yes   Other Topics Concern    None   Social History Narrative    ** Merged History Encounter **          Social Determinants of Health     Financial Resource Strain:     Difficulty of Paying Living Expenses: Not on file   Food Insecurity:     Worried About Running Out of Food in the Last Year: Not on file    Nahed of Food in the Last Year: Not on file   Transportation Needs:     Lack of Transportation (Medical): Not on file    Lack of Transportation (Non-Medical): Not on file   Physical Activity:     Days of Exercise per Week: Not on file    Minutes of Exercise per Session: Not on file   Stress:     Feeling of Stress : Not on file   Social Connections:     Frequency of Communication with Friends and Family: Not on file    Frequency of Social Gatherings with Friends and Family: Not on file    Attends Worship Services: Not on file    Active Member of [address] [place] [address] or Organizations: Not on file    Attends Club or Organization Meetings: Not on file    Marital Status: Not on file   Intimate Partner Violence:     Fear of Current or Ex-Partner: Not on file    Emotionally Abused: Not on file    Physically Abused: Not on file    Sexually Abused: Not on file   Housing Stability:     Unable to Pay for Housing in the Last Year: Not on file    Number of [place] in the Last Year: Not on file    Unstable Housing in the Last Year: Not on file       MEDICATIONS   Medications Prior to Admission  Not in a hospital admission.     Current Medications  Current Facility-Administered Medications   Medication Dose Route Frequency Provider Last Rate Last Admin    buprenorphine (SUBUTEX) SL tablet 2 mg  2 mg SubLINGual PRN Javier Carmona MD        And    cloNIDine (CATAPRES) tablet 0.1 mg  0.1 mg Oral PRN Javier Carmona MD        dicyclomine (BENTYL) tablet 20 mg  20 mg Oral Q6H PRN Javier Carmona MD        melatonin (!) 141/96, pulse 113, temperature 98.4 °F (36.9 °C), temperature source Oral, resp. rate 18, height 6' 4\" (1.93 m), weight 230 lb (104.3 kg), SpO2 100 %. GEN  -Awake, alert, NAD.   EYES   -PERRL. HENT  -MM are moist.   RESP  -LS CTA equal bilat, no wheezes, rales or rhonchi. Symmetric chest movement. No respiratory distress noted. C/V  -S1/S2 auscultated, tachycardia without appreciable M/R/G. No JVD or carotid bruits. Peripheral pulses equal bilaterally and palpable. No peripheral edema. No reproducible chest wall tenderness. GI  -Abdomen is soft, non-distended, no significant tenderness. No masses or guarding. + BS in all quadrants. Rectal exam deferred.   -No CVA tenderness. Martinez catheter is not present. MS  -left thigh with large area of induration, I&D over the posterior medial aspect, open ulcer at the injection site on anteromedial thigh, erythema, warm to touch, no crepitus felt. SKIN  -Normal coloration, warm, dry. NEURO  - Awake, alert, oriented x 3, no focal deficits. PSYC  - Appropriate affect. LABS AND IMAGING     Results for Isamar Ro (MRN [id number]) as of 12/1/2021 00:12   Ref.  Range 11/30/2021 20:20   Sodium Latest Ref Range: 135 - 145 MMOL/L 141   Potassium Latest Ref Range: 3.5 - 5.1 MMOL/L 4.2   Chloride Latest Ref Range: 99 - 110 mMol/L 104   CO2 Latest Ref Range: 21 - 32 MMOL/L 22   BUN Latest Ref Range: 6 - 23 MG/DL 13   Creatinine Latest Ref Range: 0.9 - 1.3 MG/DL 0.9   Anion Gap Latest Ref Range: 4 - 16  15   GFR Non- Latest Ref Range: >60 mL/min/1.73m2 >60   GFR African American Latest Ref Range: >60 mL/min/1.73m2 >60   Lactate, ser/plas Latest Ref Range: 0.4 - 2.0 mMOL/L 2.1 (HH)   Glucose Latest Ref Range: 70 - 99 MG/ (H)   Calcium Latest Ref Range: 8.3 - 10.6 MG/DL 9.8   Total Protein Latest Ref Range: 6.4 - 8.2 GM/DL 8.1   Albumin Latest Ref Range: 3.4 - 5.0 GM/DL 4.5   Alk Phos Latest Ref Range: 40 - 129 IU/L 91   ALT Latest Ref Range: 10 - 40 U/L 45 (H)   AST Latest Ref Range: 15 - 37 IU/L 25   Bilirubin Latest Ref Range: 0.0 - 1.0 MG/DL 0.9   WBC Latest Ref Range: 4.0 - 10.5 K/CU MM 13.5 (H)   RBC Latest Ref Range: 4.6 - 6.2 M/CU MM 5.55   Hemoglobin Quant Latest Ref Range: 13.5 - 18.0 GM/DL 16.7   Hematocrit Latest Ref Range: 42 - 52 % 50.5   MCV Latest Ref Range: 78 - 100 FL 91.0   MCH Latest Ref Range: 27 - 31 PG 30.1   MCHC Latest Ref Range: 32.0 - 36.0 % 33.1   MPV Latest Ref Range: 7.5 - 11.1 FL 9.3   RDW Latest Ref Range: 11.7 - 14.9 % 13.1   Platelet Count Latest Ref Range: 140 - 440 K/CU    Lymphocyte % Latest Ref Range: 24 - 44 % 19.6 (L)   Monocytes % Latest Ref Range: 0 - 4 % 6.6 (H)   Eosinophils % Latest Ref Range: 0 - 3 % 0.2   Basophils % Latest Ref Range: 0 - 1 % 0.2   Lymphocytes Absolute Latest Units: K/CU MM 2.6   Monocytes Absolute Latest Units: K/CU MM 0.9   Eosinophils Absolute Latest Units: K/CU MM 0.0   Basophils Absolute Latest Units: K/CU MM 0.0   Differential Type Unknown AUTOMATED DIFFERENTIAL   Segs Relative Latest Ref Range: 36 - 66 % 73.1 (H)   Segs Absolute Latest Units: K/CU MM 9.8   Nucleated RBC % Latest Units: % 0.0   Immature Neutrophil % Latest Ref Range: 0 - 0.43 % 0.3   Total Immature Neutrophil Latest Units: K/CU MM 0.04   Total Nucleated RBC Latest Units: K/CU MM 0.0     Recent Imaging    CT FEMUR LEFT W CONTRAST [[id number]] Collected: 11/30/21 2210      Order Status: Completed Updated: 11/30/21 2218     Narrative:       EXAMINATION:   CT OF THE LEFT FEMUR WITH CONTRAST 11/30/2021 10:05 pm     TECHNIQUE:   CT of the left femur was performed with the administration of intravenous   contrast.  Multiplanar reformatted images are provided for review. Dose   modulation, iterative reconstruction, and/or weight based adjustment of the   mA/kV was utilized to reduce the radiation dose to as low as reasonably   achievable. COMPARISON:   None.      HISTORY   ORDERING SYSTEM PROVIDED HISTORY: Left medial area of ill-defined confluent edema measuring approximately 5.3 x   2 x 2.6 cm in the medial subcutaneous fat of the mid thigh suspicious for   developing phlegmon versus less likely abscess.  Small foci of adjacent soft   tissue gas possibly reflecting recent intervention although a gas-forming   organism is not excluded. 4. No acute osseous abnormality.          Relevant labs and imaging reviewed    ASSESSMENT AND PLAN     #. Left thigh cellulitis and abscess:  -Secondary to IVDU  -CT femur-subcutaneous fat stranding and skin thickening, 2.8 x 1.2 x 1 cm focal collection of dense material and gas within overlying skin defect in the posterior medial aspect of mid thigh, ill-defined confluent edema measuring approximately 5.3 x 2 x 2.6 cm in the medial subcutaneous fat of the mid thigh suspicious for developing phlegmon versus less likely abscess. Small foci of adjacent soft tissue gas reported.  -Had I&D in ED  -Patient received vancomycin, clindamycin in ED  -Wound culture, blood culture sent from ED  -General surgery- Dr Mariann Cuenca consulted. -Continue vancomycin, Zosyn. #.  Patient met sepsis criteria at admission with leukocytosis, tachycardia, lactic acidosis. #. history of IVDU, opioid abuse  -Methamphetamine, fentanyl use  -Monitor for opioid withdrawal  -COWS protocol ordered    #. bipolar disorder, schizoaffective disorder, psychosis, depression, anxiety  -Continue home medications- -Continue home medications-Abilify 10 mg daily, benztropine 1 mg daily, Vraylar 4.5 mg daily, propranolol 10 mg 3 times daily, Suboxone, lithium 600 mg daily a.m., 300 mg daily p.m., risperidone 1 mg 3 times twice daily, gabapentin 600 mg 4 times a day, venlafaxine 150 mg daily. #. history of bladder cancer s/p resection 12 years ago-in remission. #. chronic hepatitis C  -Never treated, received vaccinations for hepatitis B.     #. history of subdural hematoma (9/2020)-s/p evacuation    DVT Prophylaxis: Holding Lovenox for possible I&D in a.m. GI Prophylaxis: Not indicated   code Status: FULL.       Case d/w ED physician      Arsenio Munoz MD  Hospitalist, Internal Medicine  12/1/2021 at 12:29 AM

## 2021-12-01 NOTE — PROGRESS NOTES
Brief progress note    Admission H&P done this morning reviewed. Agree with the plan. Patient seen and examined at bedside. Still reports of left eye pain. Plan for I&D by general surgery this morning.   Continue empiric IV antibiotic Zosyn/vancomycin  -Follow-up culture data The patient is a 20y Female complaining of pain, knee.

## 2021-12-02 LAB
ALBUMIN SERPL-MCNC: 3.5 GM/DL (ref 3.4–5)
ALP BLD-CCNC: 70 IU/L (ref 40–129)
ALT SERPL-CCNC: 34 U/L (ref 10–40)
ANION GAP SERPL CALCULATED.3IONS-SCNC: 8 MMOL/L (ref 4–16)
AST SERPL-CCNC: 30 IU/L (ref 15–37)
BILIRUB SERPL-MCNC: 0.7 MG/DL (ref 0–1)
BUN BLDV-MCNC: 7 MG/DL (ref 6–23)
CALCIUM SERPL-MCNC: 8.9 MG/DL (ref 8.3–10.6)
CHLORIDE BLD-SCNC: 106 MMOL/L (ref 99–110)
CO2: 23 MMOL/L (ref 21–32)
CREAT SERPL-MCNC: 0.8 MG/DL (ref 0.9–1.3)
DOSE AMOUNT: ABNORMAL
DOSE TIME: ABNORMAL
GFR AFRICAN AMERICAN: >60 ML/MIN/1.73M2
GFR NON-AFRICAN AMERICAN: >60 ML/MIN/1.73M2
GLUCOSE BLD-MCNC: 95 MG/DL (ref 70–99)
HCT VFR BLD CALC: 39.9 % (ref 42–52)
HEMOGLOBIN: 13.7 GM/DL (ref 13.5–18)
MAGNESIUM: 1.8 MG/DL (ref 1.8–2.4)
MCH RBC QN AUTO: 30 PG (ref 27–31)
MCHC RBC AUTO-ENTMCNC: 34.3 % (ref 32–36)
MCV RBC AUTO: 87.3 FL (ref 78–100)
PDW BLD-RTO: 13 % (ref 11.7–14.9)
PLATELET # BLD: 243 K/CU MM (ref 140–440)
PMV BLD AUTO: 9.6 FL (ref 7.5–11.1)
POTASSIUM SERPL-SCNC: 3.7 MMOL/L (ref 3.5–5.1)
RBC # BLD: 4.57 M/CU MM (ref 4.6–6.2)
SODIUM BLD-SCNC: 137 MMOL/L (ref 135–145)
TOTAL PROTEIN: 6.4 GM/DL (ref 6.4–8.2)
VANCOMYCIN TROUGH: 4.1 UG/ML (ref 10–20)
WBC # BLD: 7 K/CU MM (ref 4–10.5)

## 2021-12-02 PROCEDURE — 6360000002 HC RX W HCPCS: Performed by: SURGERY

## 2021-12-02 PROCEDURE — 80202 ASSAY OF VANCOMYCIN: CPT

## 2021-12-02 PROCEDURE — 6360000002 HC RX W HCPCS: Performed by: PHYSICIAN ASSISTANT

## 2021-12-02 PROCEDURE — 94761 N-INVAS EAR/PLS OXIMETRY MLT: CPT

## 2021-12-02 PROCEDURE — 6370000000 HC RX 637 (ALT 250 FOR IP): Performed by: SURGERY

## 2021-12-02 PROCEDURE — 80053 COMPREHEN METABOLIC PANEL: CPT

## 2021-12-02 PROCEDURE — 99024 POSTOP FOLLOW-UP VISIT: CPT | Performed by: PHYSICIAN ASSISTANT

## 2021-12-02 PROCEDURE — 83735 ASSAY OF MAGNESIUM: CPT

## 2021-12-02 PROCEDURE — 85027 COMPLETE CBC AUTOMATED: CPT

## 2021-12-02 PROCEDURE — 2580000003 HC RX 258: Performed by: SURGERY

## 2021-12-02 PROCEDURE — 2060000000 HC ICU INTERMEDIATE R&B

## 2021-12-02 PROCEDURE — APPNB30 APP NON BILLABLE TIME 0-30 MINS: Performed by: PHYSICIAN ASSISTANT

## 2021-12-02 PROCEDURE — 6360000002 HC RX W HCPCS: Performed by: INTERNAL MEDICINE

## 2021-12-02 RX ORDER — MORPHINE SULFATE 2 MG/ML
2 INJECTION, SOLUTION INTRAMUSCULAR; INTRAVENOUS ONCE
Status: COMPLETED | OUTPATIENT
Start: 2021-12-02 | End: 2021-12-02

## 2021-12-02 RX ORDER — VANCOMYCIN 1.5 G/300ML
1500 INJECTION, SOLUTION INTRAVENOUS EVERY 12 HOURS
Status: DISCONTINUED | OUTPATIENT
Start: 2021-12-02 | End: 2021-12-03

## 2021-12-02 RX ADMIN — LITHIUM CARBONATE 600 MG: 300 TABLET ORAL at 09:03

## 2021-12-02 RX ADMIN — SODIUM CHLORIDE: 9 INJECTION, SOLUTION INTRAVENOUS at 21:27

## 2021-12-02 RX ADMIN — PIPERACILLIN SODIUM AND TAZOBACTAM SODIUM 3375 MG: 3; .375 INJECTION, POWDER, LYOPHILIZED, FOR SOLUTION INTRAVENOUS at 17:12

## 2021-12-02 RX ADMIN — VENLAFAXINE HYDROCHLORIDE 150 MG: 150 CAPSULE, EXTENDED RELEASE ORAL at 09:02

## 2021-12-02 RX ADMIN — GABAPENTIN 600 MG: 300 CAPSULE ORAL at 17:11

## 2021-12-02 RX ADMIN — GABAPENTIN 600 MG: 300 CAPSULE ORAL at 21:22

## 2021-12-02 RX ADMIN — LITHIUM CARBONATE 300 MG: 300 CAPSULE, GELATIN COATED ORAL at 21:23

## 2021-12-02 RX ADMIN — VANCOMYCIN 1500 MG: 1.5 INJECTION, SOLUTION INTRAVENOUS at 13:14

## 2021-12-02 RX ADMIN — PROPRANOLOL HYDROCHLORIDE 10 MG: 10 TABLET ORAL at 15:10

## 2021-12-02 RX ADMIN — SODIUM CHLORIDE, PRESERVATIVE FREE 10 ML: 5 INJECTION INTRAVENOUS at 21:22

## 2021-12-02 RX ADMIN — RISPERIDONE 1 MG: 0.5 TABLET ORAL at 21:23

## 2021-12-02 RX ADMIN — BENZTROPINE MESYLATE 1 MG: 1 TABLET ORAL at 09:03

## 2021-12-02 RX ADMIN — PIPERACILLIN SODIUM AND TAZOBACTAM SODIUM 3375 MG: 3; .375 INJECTION, POWDER, LYOPHILIZED, FOR SOLUTION INTRAVENOUS at 11:52

## 2021-12-02 RX ADMIN — MORPHINE SULFATE 2 MG: 2 INJECTION, SOLUTION INTRAMUSCULAR; INTRAVENOUS at 11:48

## 2021-12-02 RX ADMIN — IBUPROFEN 800 MG: 400 TABLET ORAL at 12:37

## 2021-12-02 RX ADMIN — ARIPIPRAZOLE 10 MG: 10 TABLET ORAL at 09:03

## 2021-12-02 RX ADMIN — PROPRANOLOL HYDROCHLORIDE 10 MG: 10 TABLET ORAL at 09:03

## 2021-12-02 RX ADMIN — PIPERACILLIN SODIUM AND TAZOBACTAM SODIUM 3375 MG: 3; .375 INJECTION, POWDER, LYOPHILIZED, FOR SOLUTION INTRAVENOUS at 06:23

## 2021-12-02 RX ADMIN — GABAPENTIN 600 MG: 300 CAPSULE ORAL at 09:02

## 2021-12-02 RX ADMIN — RISPERIDONE 1 MG: 0.5 TABLET ORAL at 09:03

## 2021-12-02 RX ADMIN — PROPRANOLOL HYDROCHLORIDE 10 MG: 10 TABLET ORAL at 21:22

## 2021-12-02 RX ADMIN — VANCOMYCIN 1250 MG: 1.75 INJECTION, SOLUTION INTRAVENOUS at 01:06

## 2021-12-02 RX ADMIN — Medication 3 MG: at 21:23

## 2021-12-02 RX ADMIN — GABAPENTIN 600 MG: 300 CAPSULE ORAL at 11:48

## 2021-12-02 ASSESSMENT — ENCOUNTER SYMPTOMS
CONSTIPATION: 0
STRIDOR: 0
BACK PAIN: 0
RECTAL PAIN: 0
SORE THROAT: 0
ANAL BLEEDING: 0
PHOTOPHOBIA: 0
CHOKING: 0
EYE ITCHING: 0
COLOR CHANGE: 0
EYE REDNESS: 0
APNEA: 0

## 2021-12-02 ASSESSMENT — PAIN DESCRIPTION - ORIENTATION
ORIENTATION: LEFT

## 2021-12-02 ASSESSMENT — PAIN SCALES - GENERAL
PAINLEVEL_OUTOF10: 8
PAINLEVEL_OUTOF10: 4
PAINLEVEL_OUTOF10: 6

## 2021-12-02 ASSESSMENT — PAIN DESCRIPTION - PAIN TYPE
TYPE: SURGICAL PAIN

## 2021-12-02 ASSESSMENT — PAIN DESCRIPTION - DESCRIPTORS
DESCRIPTORS: ACHING;BURNING
DESCRIPTORS: ACHING;BURNING

## 2021-12-02 ASSESSMENT — PAIN DESCRIPTION - LOCATION
LOCATION: LEG

## 2021-12-02 ASSESSMENT — PAIN DESCRIPTION - PROGRESSION
CLINICAL_PROGRESSION: GRADUALLY IMPROVING
CLINICAL_PROGRESSION: GRADUALLY WORSENING

## 2021-12-02 ASSESSMENT — PAIN DESCRIPTION - ONSET
ONSET: ON-GOING
ONSET: ON-GOING

## 2021-12-02 ASSESSMENT — PAIN DESCRIPTION - FREQUENCY
FREQUENCY: CONTINUOUS
FREQUENCY: CONTINUOUS

## 2021-12-02 NOTE — PROGRESS NOTES
[address] [facility]  consulted by Dr. Gurwinder Campuzano for monitoring and adjustment. Indication for treatment: Abscess of left thigh  Goal trough: 10-15 mcg/mL  AUC/MACKENZIE: < 500    Pertinent Laboratory Values:   Temp Readings from Last 3 Encounters:   12/02/21 96.9 °F (36.1 °C) (Temporal)   09/09/20 97.9 °F (36.6 °C) (Oral)   08/29/20 98 °F (36.7 °C) (Oral)     Recent Labs     11/30/21 2020 12/01/21 0312 12/01/21 0604 12/02/21  0630   WBC 13.5*  --  9.9 7.0   LACTATE 2.1* 1.0  --   --      Recent Labs     11/30/21 2020 12/01/21 0604 12/02/21 0630   BUN 13 12 7   CREATININE 0.9 1.0 0.8*     Estimated Creatinine Clearance: 153 mL/min (A) (based on SCr of 0.8 mg/dL (L)). Intake/Output Summary (Last 24 hours) at 12/2/2021 1206  Last data filed at 12/1/2021 1513  Gross per 24 hour   Intake 985 ml   Output 710 ml   Net 275 ml       Pertinent Cultures:  Date    Source    Results  11/30   Wound    Staphylococcus aureus  11/30   Blood    Ordered    Vancomycin level:   TROUGH:    Recent Labs     12/02/21  0630   GERDAOTROUGH 4.1*     RANDOM:  No results for input(s): VANCORANDOM in the last 72 hours. Assessment:  · WBC and temperature: WBC WNL/afebrile   · SCr, BUN, and urine output: trending down  · History of IVDU  · Day(s) of therapy: 3  · Vancomycin concentration: 4.1    Plan:  · Increase to vancomycin 1500mg q12h   · Pharmacy will continue to monitor patient and adjust therapy as indicated    [place] [address] 12/04/21 @0600    Thank you for the consult.   Lexy Dowling, [facility]  12/2/2021 12:06 PM

## 2021-12-02 NOTE — CARE COORDINATION
CM reviewed chart. Pt does not have a PCP. CM placed a list of options on discharge instructions.  [facility]

## 2021-12-02 NOTE — PROGRESS NOTES
Patient up ambulating independently on unit today. Tolerating diet well. IV leaking, new IV started and infusing at this time. Dressing changed, patient educated. Patient tolerated very well.

## 2021-12-02 NOTE — CARE COORDINATION
CM into see pt to initiate a safe discharge plan. Cm , introduced self and explained role of CM. Pt is kind, alert and oriented. Pt lives with his girlfriend and mother. Pt is typically independent for all his ADL's. Pt does not have a pcp. Options provided. Pt has insurance and is able to obtain his prescriptions. Pt has transportation per girlfriend. Pt is able to obtain groceries. Discharge home with girlfriend. Offered and declined home care. Feels that girlfriend and pt will be able to manage dsgs. PCP information given and pt has insurance.   CM provided card and encouraged to call for any needs or concern. CM is available if any needs arise.

## 2021-12-02 NOTE — CONSULTS
Department of GeneralSurgery   Surgical Service Dr Leno Pena   Consult Note    Date of Consult: 12/1/21      Reason for Consult:  Left thigh abscess x 2  Requesting Physician:  Dr Aissatou Pacheco:  Left thigh pain    History Obtained From:  patient    HISTORY OF PRESENT ILLNESS:                The patient is a 55 y.o. male who presents with known IVDU hep C presented with left thigh infections sited cellulitis and abscess. Pain is described as burning, pressure-like, shooting and stabbing. Pain level is 9/10. Pt with h/o multiple abscesses drained. Pain worse with ambulation and better when resting. The extent of cellulitis is worsening rapidly and CT was performed in ED which showed      Impression   1. Subcutaneous fat stranding and skin thickening in the medial aspects of   the mid and distal thigh extending posteriorly in the distal thigh compatible   with cellulitis. 2. 2.8 x 1.2 x 1 cm focal collection of dense material and gas within   overlying skin defect in the posteromedial aspect of the mid thigh. Recommend correlation with recent incision and drainage. 3. Separate area of ill-defined confluent edema measuring approximately 5.3 x   2 x 2.6 cm in the medial subcutaneous fat of the mid thigh suspicious for   developing phlegmon versus less likely abscess.  Small foci of adjacent soft   tissue gas possibly reflecting recent intervention although a gas-forming   organism is not excluded.    4. No acute osseous abnormality.                 Past Medical History:    Past Medical History:   Diagnosis Date    Anxiety     Back pain     Bipolar 1 disorder ([address]     Bipolar affect, depressed ([address]     Bipolar disorder ([address]     Bladder cancer ([address]     Cancer ([address]     bladder    Depression     Hepatitis C     Herniated disc     lumbar    Osteoarthritis     Psychosis ([address]     Schizoaffective disorder ([address]     Schizophrenia ([address]     Scoliosis        Past Surgical History:    Past  cariprazine hcl (VRAYLAR) capsule 4.5 mg  4.5 mg Oral Daily Alana Contreras MD        gabapentin (NEURONTIN) capsule 600 mg  600 mg Oral 4x daily oJsee Marroquin MD   600 mg at 12/01/21 1706    lithium tablet 600 mg  600 mg Oral Daily Alana Contreras MD        lithium capsule 300 mg  300 mg Oral Nightly Josee Marroquin MD   300 mg at 12/01/21 0232    propranolol (INDERAL) tablet 10 mg  10 mg Oral TID Alana Contreras MD   10 mg at 12/01/21 1707    risperiDONE (RISPERDAL) tablet 1 mg  1 mg Oral BID Alana Contreras MD   1 mg at 12/01/21 0231    venlafaxine (EFFEXOR XR) extended release capsule 150 mg  150 mg Oral Daily Alana Contreras MD        vancomycin (VANCOCIN) 1250 mg in 250 mL IVPB  1,250 mg IntraVENous Q12H Josee Marroquin MD        0.9 % sodium chloride infusion   IntraVENous Continuous Alana Contreras  mL/hr at 12/01/21 1423 New Bag at 12/01/21 1423       Allergies:  Codeine    Social History:   Social History     Socioeconomic History    Marital status: Single     Spouse name: Kimberly Shearer Number of children: 3    Years of education: 15    Highest education level: None   Occupational History    Occupation: unemployed   Tobacco Use    Smoking status: Current Every Day Smoker     Packs/day: 1.50     Years: 22.00     Pack years: 33.00     Types: Cigarettes    Smokeless tobacco: Never Used   Vaping Use    Vaping Use: Never used   Substance and Sexual Activity    Alcohol use: No    Drug use: Yes     Frequency: 1.0 times per week     Types: Marijuana (Julien Coins), IV, Methamphetamines (Crystal Meth)     Comment: fentanyl    Sexual activity: Yes   Other Topics Concern    None   Social History Narrative    ** Merged History Encounter **          Social Determinants of Health     Financial Resource Strain:     Difficulty of Paying Living Expenses: Not on file   Food Insecurity:     Worried About Running Out of Food in the Last Year: Not on file    Nahed of Food in the Last Year: Not on TIFFANY Scott Needs:     Lack of Transportation (Medical): Not on file    Lack of Transportation (Non-Medical): Not on file   Physical Activity:     Days of Exercise per Week: Not on file    Minutes of Exercise per Session: Not on file   Stress:     Feeling of Stress : Not on file   Social Connections:     Frequency of Communication with Friends and Family: Not on file    Frequency of Social Gatherings with Friends and Family: Not on file    Attends Anabaptism Services: Not on file    Active Member of Clubs or Organizations: Not on file    Attends Club or Organization Meetings: Not on file    Marital Status: Not on file   Intimate Partner Violence:     Fear of Current or Ex-Partner: Not on file    Emotionally Abused: Not on file    Physically Abused: Not on file    Sexually Abused: Not on file   Housing Stability:     Unable to Pay for Housing in the Last Year: Not on file    Number of [place] in the Last Year: Not on file    Unstable Housing in the Last Year: Not on file       Family History:   Family History   Problem Relation Age of Onset    Diabetes Mother     Cancer Mother         lung    Heart Disease Mother     Seizures Mother     Seizures Sister     Cancer Maternal Uncle         lung    Cancer Maternal Grandmother         lung    Cancer Maternal Uncle         lung       REVIEW OFSYSTEMS:    Review of Systems   Constitutional: Negative for chills and fever. HENT: Negative for ear pain, mouth sores, sore throat and tinnitus. Eyes: Negative for photophobia, redness and itching. Respiratory: Negative for apnea, choking and stridor. Cardiovascular: Negative for chest pain and palpitations. Gastrointestinal: Negative for anal bleeding, constipation and rectal pain. Endocrine: Negative for polydipsia. Genitourinary: Negative for enuresis, flank pain and hematuria. Musculoskeletal: Negative for back pain, joint swelling and myalgias. Skin: Positive for wound.  Negative for color change and pallor. Allergic/Immunologic: Negative for environmental allergies. Neurological: Negative for syncope and speech difficulty. Psychiatric/Behavioral: Negative for confusion and hallucinations. PHYSICAL EXAM:  Vitals:    12/01/21 1545 12/01/21 1600 12/01/21 1615 12/01/21 1707   BP: 129/87 (!) 134/91 (!) 147/98 129/81   Pulse: 74 75 87    Resp: 16 16 16    Temp:   97.7 °F (36.5 °C)    TempSrc:   Tympanic    SpO2: 99% 99% 97%    Weight:       Height:           Physical Exam  Constitutional:       Appearance: He is well-developed. HENT:      Head: Normocephalic. Eyes:      Pupils: Pupils are equal, round, and reactive to light. Cardiovascular:      Rate and Rhythm: Normal rate. Pulmonary:      Effort: Pulmonary effort is normal.   Abdominal:      General: There is no distension. Palpations: Abdomen is soft. There is no mass. Tenderness: There is no abdominal tenderness. There is no guarding or rebound. Musculoskeletal:         General: Normal range of motion. Cervical back: Normal range of motion and neck supple. Legs:    Skin:     General: Skin is warm. Neurological:      Mental Status: He is alert and oriented to person, place, and time.            DATA:    CBC with Differential:    Lab Results   Component Value Date    WBC 7.0 12/02/2021    RBC 4.57 12/02/2021    HGB 13.7 12/02/2021    HCT 39.9 12/02/2021     12/02/2021    MCV 87.3 12/02/2021    MCH 30.0 12/02/2021    MCHC 34.3 12/02/2021    RDW 13.0 12/02/2021    NRBC 0 10/20/2012    SEGSPCT 71.8 12/01/2021    LYMPHOPCT 18.1 12/01/2021    MONOPCT 8.7 12/01/2021    BASOPCT 0.2 12/01/2021    MONOSABS 0.9 12/01/2021    LYMPHSABS 1.8 12/01/2021    EOSABS 0.1 12/01/2021    BASOSABS 0.0 12/01/2021    DIFFTYPE AUTOMATED DIFFERENTIAL 12/01/2021     CMP:    Lab Results   Component Value Date     12/02/2021    K 3.7 12/02/2021     12/02/2021    CO2 23 12/02/2021    BUN 7 12/02/2021    CREATININE 0.8 12/02/2021    GFRAA >60 12/02/2021    LABGLOM >60 12/02/2021    LABGLOM >90 10/20/2012    GLUCOSE 95 12/02/2021    PROT 6.4 12/02/2021    LABALBU 3.5 12/02/2021    CALCIUM 8.9 12/02/2021    BILITOT 0.7 12/02/2021    ALKPHOS 70 12/02/2021    AST 30 12/02/2021    ALT 34 12/02/2021       IMPRESSION:        Patient Active Problem List:     Bipolar affective disorder ([facility] [address]     Osteoarthritis of lumbar spine     Heavy smoker (more than 20 cigarettes per day)     Cannabis abuse      history of bladder cancer     Restless leg syndrome     Abscess of left thigh      PLAN:    Will proceed with OR incision and drainage and possible debridement of the two areas.          Denton Lewis MD

## 2021-12-02 NOTE — PROGRESS NOTES
Hospitalist Progress Note      Name:  Teresa Wilkins /Age/Sex: 1975  (55 y.o. male)   MRN & CSN:  [id number] & [id number] Admission Date/Time: 2021  6:19 PM   Location:  [place] [address] PCP: No primary care provider on file. Hospital Day: 3    Assessment and Plan:   Teresa Wilkins is a 55 y.o.  male with past medical history of known IV drug use, hepatitis C, bipolar disorder/schizoaffective disorder, past history of bladder cancer, subdural hematoma 2020, was admitted on 2021 for evaluation of swelling/pain in left upper thigh. Patient reportedly injected methamphetamine in at that site but missed the vein. Seen by general surgery. Patient was noted to have abscess of the left leg x2 and underwent incision and drainage on 2021. Left thigh cellulitis/abscess x2  -S/p I&D  -General surgery management appreciated  -Currently on broad-spectrum antibiotic Zosyn/vancomycin.  -Wound culture showing light growth staph aureus, sensitivities pending  -Wound care as per surgery recommendation. Outpatient follow-up in 1 week. Sepsis  -Secondary to above, present on admission    History of IV drug use  -Cessation advised  -COWS protocol    Bipolar disorder/schizoaffective disorder  -Continue Abilify/benztropine/Vraylar/Suboxone/lithium/risperidone/gabapentin/Effexor XR    Chronic hep C  -Outpatient PCP/GI follow-up    History of subdural hematoma  -2020, s/p evacuation    DVT prophylaxis  -SCDs          Diet ADULT DIET;  Regular   DVT Prophylaxis [] Lovenox, []  Heparin, [x] SCDs, [] Ambulation   GI Prophylaxis [] PPI,  [] H2 Blocker,  [] Carafate,  [] Diet/Tube Feeds   Code Status Full Code   Disposition Patient requires continued admission due to IV antibiotics   MDM [] Low, [x] Moderate,[x]  High  Patient's risk as above due to sepsis secondary to left thigh abscess     History of Present Illness:     Chief Complaint: See above    Subjective    Patient seen and examined at bedside. Reports pain is better at the surgical site. No acute overnight events noted. Remains afebrile. Ten point ROS reviewed negative, unless as noted above    Objective: Intake/Output Summary (Last 24 hours) at 12/2/2021 1529  Last data filed at 12/2/2021 1000  Gross per 24 hour   Intake 360 ml   Output --   Net 360 ml      Vitals:   Vitals:    12/02/21 1216   BP:    Pulse:    Resp: 16   Temp:    SpO2: 98%     Physical Exam:   GEN no acute distress  RESP Clear to auscultation, no wheezes, rales or rhonchi. Symmetric chest movement while on room air. CARDIO/VASC S1/S2 auscultated. Regular rate. No peripheral edema. GI Abdomen is soft without significant tenderness, masses, or guarding. Bowel sounds are normoactive. MSK left thigh-dressing intact medial aspect. NEURO AAO x3. No gross focal neurological deficit evident at this time.   Medications:   Medications:    vancomycin  1,500 mg IntraVENous Q12H    sodium chloride flush  5-40 mL IntraVENous 2 times per day    piperacillin-tazobactam  3,375 mg IntraVENous Q6H    melatonin  3 mg Oral Nightly    ARIPiprazole  10 mg Oral Daily    benztropine  1 mg Oral Daily    cariprazine hcl  4.5 mg Oral Daily    gabapentin  600 mg Oral 4x daily    lithium  600 mg Oral Daily    lithium  300 mg Oral Nightly    propranolol  10 mg Oral TID    risperiDONE  1 mg Oral BID    venlafaxine  150 mg Oral Daily      Infusions:    sodium chloride      sodium chloride 125 mL/hr at 12/01/21 1423     PRN Meds: sodium chloride flush, 5-40 mL, PRN  sodium chloride, 25 mL, PRN  ondansetron, 4 mg, Q8H PRN   Or  ondansetron, 4 mg, Q6H PRN  polyethylene glycol, 17 g, Daily PRN  acetaminophen, 650 mg, Q6H PRN   Or  acetaminophen, 650 mg, Q6H PRN  buprenorphine, 2 mg, PRN   And  cloNIDine, 0.1 mg, PRN  dicyclomine, 20 mg, Q6H PRN  ibuprofen, 800 mg, Q8H PRN  hydrOXYzine, 50 mg, Q8H PRN          Electronically signed by Roger Roy MD on 12/2/2021 at 3:29 PM

## 2021-12-02 NOTE — PROGRESS NOTES
200 Pt brought to unit, bedside report received from Vijay Hernandez, [facility]. Pt. Is A&O, surgical site dressing dry and intact. Pt. Ambulated cameron to bathroom, tolerated well. Education provided on call light, call light in reaCh.

## 2021-12-02 NOTE — PROGRESS NOTES
GENERAL SURGERY PROGRESS NOTE    Melony Carrasquillo is a 55 y.o. male with 1 Day Post-Op L thigh I&D. Subjective:    Pain: 4/10   Diet: ADULT DIET; Regular  Activity: Tolerating well    Patient reports that pain is improved today. [place] to go home. Denies fevers or chills. Review of Systems   Constitutional: Negative for chills and fever. HENT: Negative for ear pain, mouth sores, sore throat and tinnitus. Eyes: Negative for photophobia, redness and itching. Respiratory: Negative for apnea, choking and stridor. Cardiovascular: Negative for chest pain and palpitations. Gastrointestinal: Negative for anal bleeding, constipation and rectal pain. Endocrine: Negative for polydipsia. Genitourinary: Negative for enuresis, flank pain and hematuria. Musculoskeletal: Positive for arthralgias. Negative for back pain, joint swelling and myalgias. Skin: Positive for wound. Negative for color change and pallor. Allergic/Immunologic: Negative for environmental allergies. Neurological: Negative for syncope and speech difficulty. Psychiatric/Behavioral: Negative for confusion and hallucinations. Objective:    Vitals: VITALS:  BP (!) 142/85   Pulse 73   Temp 97.6 °F (36.4 °C) (Temporal)   Resp 18   Ht 6' 4\" (1.93 m)   Wt 230 lb (104.3 kg)   SpO2 95%   BMI 28.00 kg/m²   TEMPERATURE:  Current - Temp: 97.6 °F (36.4 °C);  Max - Temp  Av.6 °F (36.4 °C)  Min: 96.8 °F (36 °C)  Max: 98.5 °F (36.9 °C)    I/O:  0701 -  0700  In: 985 [P.O.:10; I.V.:975]  Out: 710 [Urine:700]    Labs/Imaging Results:   Lab Results   Component Value Date    WBC 7.0 2021    HGB 13.7 2021    HCT 39.9 (L) 2021    MCV 87.3 2021     2021     Lab Results   Component Value Date     2021    K 3.7 2021     2021    CO2 23 2021    BUN 7 2021    CREATININE 0.8 (L) 2021    GLUCOSE 95 2021    CALCIUM 8.9 2021 PROT 6.4 12/02/2021    LABALBU 3.5 12/02/2021    BILITOT 0.7 12/02/2021    ALKPHOS 70 12/02/2021    AST 30 12/02/2021    ALT 34 12/02/2021    LABGLOM >60 12/02/2021    GFRAA >60 12/02/2021       Scheduled Meds:   sodium chloride flush, 5-40 mL, IntraVENous, 2 times per day    piperacillin-tazobactam, 3,375 mg, IntraVENous, Q6H    melatonin, 3 mg, Oral, Nightly    ARIPiprazole, 10 mg, Oral, Daily    benztropine, 1 mg, Oral, Daily    cariprazine hcl, 4.5 mg, Oral, Daily    gabapentin, 600 mg, Oral, 4x daily    lithium, 600 mg, Oral, Daily    lithium, 300 mg, Oral, Nightly    propranolol, 10 mg, Oral, TID    risperiDONE, 1 mg, Oral, BID    venlafaxine, 150 mg, Oral, Daily    vancomycin, 1,250 mg, IntraVENous, Q12H    Physical Exam:  Physical Exam  Constitutional:       Appearance: He is well-developed. HENT:      Head: Normocephalic. Eyes:      Pupils: Pupils are equal, round, and reactive to light. Cardiovascular:      Rate and Rhythm: Normal rate. Pulmonary:      Effort: Pulmonary effort is normal.   Abdominal:      General: There is no distension. Palpations: Abdomen is soft. There is no mass. Tenderness: There is no abdominal tenderness. There is no guarding or rebound. Musculoskeletal:         General: Normal range of motion. Cervical back: Normal range of motion and neck supple. Skin:     General: Skin is warm. Comments: Dressing intact with bloody drainage as expected. Penrose drain in place between 2 incisions. Iodoform packing in place to both incisions. Neurological:      Mental Status: He is alert and oriented to person, place, and time. Assessment and Plan:    Patient Active Problem List:     Bipolar affective disorder ([facility] [address]     Osteoarthritis of lumbar spine     Heavy smoker (more than 20 cigarettes per day)     Cannabis abuse      history of bladder cancer     Restless leg syndrome     Abscess of left thigh      Diet: As tolerated.    Wound care: Daily dressing and packing changes. Remove old packing. Wash thigh with soap and water. Once dry, reapply 1/2\" iodoform packing to each of the incisions, following the tunneling. Cover with ABDs and kerlix wrap. Drains: Keep penrose drain in place. Activity: As tolerated  Abx: Cultures pending. On broad spectrum IV abx for now  Med changes: None   Labs/Imaging: Reviewed  Disposition: OK to d/c from surgical standpoint, once abx recommendations are finalized. Will need dressing changes daily at home. Pt reports that he and his girlfriend will be able to manage without HHC. Will f/u in our office next week.        Bhavya Vargas PA-C

## 2021-12-03 VITALS
RESPIRATION RATE: 16 BRPM | TEMPERATURE: 97.9 F | HEART RATE: 82 BPM | SYSTOLIC BLOOD PRESSURE: 133 MMHG | HEIGHT: 76 IN | OXYGEN SATURATION: 95 % | BODY MASS INDEX: 28.01 KG/M2 | WEIGHT: 230 LBS | DIASTOLIC BLOOD PRESSURE: 90 MMHG

## 2021-12-03 LAB
ALBUMIN SERPL-MCNC: 3.6 GM/DL (ref 3.4–5)
ANION GAP SERPL CALCULATED.3IONS-SCNC: 10 MMOL/L (ref 4–16)
BUN BLDV-MCNC: 8 MG/DL (ref 6–23)
CALCIUM SERPL-MCNC: 8.5 MG/DL (ref 8.3–10.6)
CHLORIDE BLD-SCNC: 109 MMOL/L (ref 99–110)
CO2: 22 MMOL/L (ref 21–32)
CREAT SERPL-MCNC: 1 MG/DL (ref 0.9–1.3)
GFR AFRICAN AMERICAN: >60 ML/MIN/1.73M2
GFR NON-AFRICAN AMERICAN: >60 ML/MIN/1.73M2
GLUCOSE BLD-MCNC: 108 MG/DL (ref 70–99)
PHOSPHORUS: 3.8 MG/DL (ref 2.5–4.9)
POTASSIUM SERPL-SCNC: 3.8 MMOL/L (ref 3.5–5.1)
SODIUM BLD-SCNC: 141 MMOL/L (ref 135–145)

## 2021-12-03 PROCEDURE — 6370000000 HC RX 637 (ALT 250 FOR IP): Performed by: INTERNAL MEDICINE

## 2021-12-03 PROCEDURE — APPNB30 APP NON BILLABLE TIME 0-30 MINS: Performed by: PHYSICIAN ASSISTANT

## 2021-12-03 PROCEDURE — 6370000000 HC RX 637 (ALT 250 FOR IP): Performed by: SURGERY

## 2021-12-03 PROCEDURE — 2580000003 HC RX 258: Performed by: SURGERY

## 2021-12-03 PROCEDURE — 6360000002 HC RX W HCPCS: Performed by: SURGERY

## 2021-12-03 PROCEDURE — 99024 POSTOP FOLLOW-UP VISIT: CPT | Performed by: PHYSICIAN ASSISTANT

## 2021-12-03 PROCEDURE — 80069 RENAL FUNCTION PANEL: CPT

## 2021-12-03 PROCEDURE — 6360000002 HC RX W HCPCS: Performed by: INTERNAL MEDICINE

## 2021-12-03 RX ORDER — CEPHALEXIN 500 MG/1
500 CAPSULE ORAL EVERY 6 HOURS SCHEDULED
Status: DISCONTINUED | OUTPATIENT
Start: 2021-12-03 | End: 2021-12-03 | Stop reason: HOSPADM

## 2021-12-03 RX ORDER — CEPHALEXIN 500 MG/1
500 CAPSULE ORAL 4 TIMES DAILY
Qty: 20 CAPSULE | Refills: 0 | Status: SHIPPED | OUTPATIENT
Start: 2021-12-03 | End: 2021-12-08

## 2021-12-03 RX ORDER — IBUPROFEN 800 MG/1
800 TABLET ORAL EVERY 8 HOURS PRN
Qty: 15 TABLET | Refills: 0 | Status: SHIPPED | OUTPATIENT
Start: 2021-12-03 | End: 2021-12-08

## 2021-12-03 RX ADMIN — PIPERACILLIN SODIUM AND TAZOBACTAM SODIUM 3375 MG: 3; .375 INJECTION, POWDER, LYOPHILIZED, FOR SOLUTION INTRAVENOUS at 05:25

## 2021-12-03 RX ADMIN — PROPRANOLOL HYDROCHLORIDE 10 MG: 10 TABLET ORAL at 09:42

## 2021-12-03 RX ADMIN — RISPERIDONE 1 MG: 0.5 TABLET ORAL at 09:41

## 2021-12-03 RX ADMIN — ARIPIPRAZOLE 10 MG: 10 TABLET ORAL at 09:41

## 2021-12-03 RX ADMIN — VENLAFAXINE HYDROCHLORIDE 150 MG: 150 CAPSULE, EXTENDED RELEASE ORAL at 09:41

## 2021-12-03 RX ADMIN — LITHIUM CARBONATE 600 MG: 300 TABLET ORAL at 09:41

## 2021-12-03 RX ADMIN — PIPERACILLIN SODIUM AND TAZOBACTAM SODIUM 3375 MG: 3; .375 INJECTION, POWDER, LYOPHILIZED, FOR SOLUTION INTRAVENOUS at 00:25

## 2021-12-03 RX ADMIN — GABAPENTIN 600 MG: 300 CAPSULE ORAL at 09:41

## 2021-12-03 RX ADMIN — ACETAMINOPHEN 650 MG: 325 TABLET ORAL at 11:09

## 2021-12-03 RX ADMIN — BENZTROPINE MESYLATE 1 MG: 1 TABLET ORAL at 09:41

## 2021-12-03 RX ADMIN — CEPHALEXIN 500 MG: 500 CAPSULE ORAL at 10:08

## 2021-12-03 RX ADMIN — VANCOMYCIN 1500 MG: 1.5 INJECTION, SOLUTION INTRAVENOUS at 02:05

## 2021-12-03 ASSESSMENT — ENCOUNTER SYMPTOMS
COLOR CHANGE: 0
STRIDOR: 0
PHOTOPHOBIA: 0
APNEA: 0
ANAL BLEEDING: 0
EYE REDNESS: 0
BACK PAIN: 0
RECTAL PAIN: 0
SORE THROAT: 0
EYE ITCHING: 0
CHOKING: 0
CONSTIPATION: 0

## 2021-12-03 ASSESSMENT — PAIN DESCRIPTION - LOCATION: LOCATION: LEG

## 2021-12-03 ASSESSMENT — PAIN SCALES - GENERAL
PAINLEVEL_OUTOF10: 5
PAINLEVEL_OUTOF10: 3
PAINLEVEL_OUTOF10: 2

## 2021-12-03 ASSESSMENT — PAIN DESCRIPTION - PAIN TYPE: TYPE: SURGICAL PAIN

## 2021-12-03 ASSESSMENT — PAIN DESCRIPTION - ORIENTATION: ORIENTATION: LEFT

## 2021-12-03 NOTE — DISCHARGE SUMMARY
Discharge Summary           Name:  Chuy Horta /Age/Sex: 1975  (55 y.o. male)   MRN & CSN:  [id number] & [id number] Admission Date/Time: 2021  6:19 PM   Attending:  Anil Rao MD Discharging Physician: Anil Rao MD     Hospital Course:   Chuy Horta is a 55 y.o.  male with past medical history of known IV drug use, hepatitis C, bipolar disorder/schizoaffective disorder, past history of bladder cancer, subdural hematoma 2020, was admitted on 2021 for evaluation of swelling/pain in left upper thigh. Patient reportedly injected methamphetamine in at that site but missed the vein. Seen by general surgery. Patient was noted to have abscess of the left leg x2 and underwent incision and drainage on 2021. Wound culture showing light growth staph aureus (MSSA). Patient is being discharged on p.o. Keflex and recommended to follow-up with general surgery as outpatient. Advised against IV drug use. Patient denied any acute concerns at the time of discharge including fever, chills, abdominal pain, nausea vomiting, diarrhea. Reports pain is better at the surgical site.     Left thigh cellulitis/abscess x2  -S/p I&D  Sepsis  History of IV drug use  Bipolar disorder/schizoaffective disorder  Chronic hep C  History of subdural hematoma      The patient expressed appropriate understanding of and agreement with the discharge recommendations, medications, and plan. Consults this admission:  PHARMACY TO DOSE VANCOMYCIN  IP CONSULT TO HOSPITALIST  IP CONSULT TO GENERAL SURGERY  PHARMACY TO DOSE VANCOMYCIN    Discharge Instruction:   Follow up appointments: Advise outpatient follow-up with general surgery as recommended.   Primary care physician:  within 2 weeks    Diet:  regular diet   Activity: activity as tolerated  Disposition: Discharged to:   [x]Home, []HHC, []SNF, []Acute Rehab, []Hospice   Condition on discharge: Stable    Discharge Medications:        Medication List      START taking these medications    cephALEXin 500 MG capsule  Commonly known as: KEFLEX  Take 1 capsule by mouth 4 times daily for 5 days     ibuprofen 800 MG tablet  Commonly known as: ADVIL;MOTRIN  Take 1 tablet by mouth every 8 hours as needed for Pain        CHANGE how you take these medications    risperiDONE 1 MG tablet  Commonly known as: RISPERDAL  Take 1 tablet by mouth three times daily  What changed: when to take this        CONTINUE taking these medications    acetaminophen 325 MG tablet  Commonly known as: Aminofen  Take 2 tablets by mouth every 6 hours as needed for Pain     ARIPiprazole 10 MG tablet  Commonly known as: ABILIFY     benztropine 1 MG tablet  Commonly known as: COGENTIN     gabapentin 600 MG tablet  Commonly known as: Neurontin  Take 1 tablet by mouth 4 times daily. lithium 300 MG tablet  Take 1 tablet by mouth 3 times daily     propranolol 10 MG tablet  Commonly known as: INDERAL     Suboxone 8-2 MG Film SL film  Generic drug: buprenorphine-naloxone     venlafaxine 150 MG extended release capsule  Commonly known as: EFFEXOR XR  Take 1 capsule by mouth daily. Vraylar 4.5 MG Caps capsule  Generic drug: cariprazine hcl        STOP taking these medications    naproxen 500 MG tablet  Commonly known as: Naprosyn           Where to Get Your Medications      These medications were sent to [address] [place] [address]. - P [phone] - F [phone]  [address] [place] [id number]    Phone: [phone]   cephALEXin 500 MG capsule  ibuprofen 800 MG tablet          Objective Findings at Discharge:   BP (!) 133/90   Pulse 82   Temp 97.9 °F (36.6 °C) (Temporal)   Resp 16   Ht 6' 4\" (1.93 m)   Wt 230 lb (104.3 kg)   SpO2 95%   BMI 28.00 kg/m²            PHYSICAL EXAM   GEN    no acute distress  RESP  Clear to auscultation, no wheezes, rales or rhonchi. Symmetric chest movement while on room air. CARDIO/VASC           S1/S2 auscultated. Regular rate. No peripheral edema. GI        Abdomen is soft without significant tenderness, masses, or guarding. Bowel sounds are normoactive. MSK    left thigh-dressing intact medial aspect. NEURO           AAO x3. No gross focal neurological deficit evident at this time    BMP/CBC  Recent Labs     11/30/21 2020 11/30/21 2020 12/01/21  0604 12/02/21  0630 12/03/21  0614      < > 140 137 141   K 4.2   < > 3.7 3.7 3.8      < > 106 106 109   CO2 22   < > 23 23 22   BUN 13   < > 12 7 8   CREATININE 0.9   < > 1.0 0.8* 1.0   WBC 13.5*  --  9.9 7.0  --    HCT 50.5  --  41.2* 39.9*  --      --  279 243  --     < > = values in this interval not displayed.          Discharge Time of 35 minutes    Electronically signed by Brianna Lynne MD on 12/3/2021 at 9:48 AM

## 2021-12-03 NOTE — PROGRESS NOTES
[facility] instruction reviewed with pt, pt. Expresses understanding. Pt. Sent home with proper wound supplies also.

## 2021-12-03 NOTE — FLOWSHEET NOTE
While in to attempt for blood draw, Patient stated, \" I need to go to bathroom. \" When helping patient up from bed, the nurse discovered feces on bed sheet, gown and chux pad. Feces are yellow green, extremely soft and runny. Ambulated patient to bathroom, Patient ambulated well. Cleaned patient with mani wipes. Changed bed sheets and provided patient with new gown and bed sheets. Patient denies further needs at this time.

## 2021-12-03 NOTE — PROGRESS NOTES
GENERAL SURGERY PROGRESS NOTE    Desire Rodrigues is a 55 y.o. male with 2 Days Post-Op L thigh I&D. Subjective:    Pain: 4/10   Diet: ADULT DIET; Regular  Activity: Tolerating well    Patient reports that pain is well controlled. Eager to d/c. Culture shows S. Aureus    Review of Systems   Constitutional: Negative for chills and fever. HENT: Negative for ear pain, mouth sores, sore throat and tinnitus. Eyes: Negative for photophobia, redness and itching. Respiratory: Negative for apnea, choking and stridor. Cardiovascular: Negative for chest pain and palpitations. Gastrointestinal: Negative for anal bleeding, constipation and rectal pain. Endocrine: Negative for polydipsia. Genitourinary: Negative for enuresis, flank pain and hematuria. Musculoskeletal: Positive for arthralgias. Negative for back pain, joint swelling and myalgias. Skin: Positive for wound. Negative for color change and pallor. Allergic/Immunologic: Negative for environmental allergies. Neurological: Negative for syncope and speech difficulty. Psychiatric/Behavioral: Negative for confusion and hallucinations. Objective:    Vitals: VITALS:  BP (!) 133/90   Pulse 82   Temp 97.9 °F (36.6 °C) (Temporal)   Resp 16   Ht 6' 4\" (1.93 m)   Wt 230 lb (104.3 kg)   SpO2 95%   BMI 28.00 kg/m²   TEMPERATURE:  Current - Temp: 97.9 °F (36.6 °C);  Max - Temp  Av.6 °F (36.4 °C)  Min: 97.3 °F (36.3 °C)  Max: 97.9 °F (36.6 °C)    I/O: 701 - 700  In: 720 [P.O.:720]  Out: -     Labs/Imaging Results:   Lab Results   Component Value Date    WBC 7.0 2021    HGB 13.7 2021    HCT 39.9 (L) 2021    MCV 87.3 2021     2021     Lab Results   Component Value Date     2021    K 3.8 2021     2021    CO2 22 2021    BUN 8 2021    CREATININE 1.0 2021    GLUCOSE 108 (H) 2021    CALCIUM 8.5 2021    PROT 6.4 2021 LABALBU 3.6 12/03/2021    BILITOT 0.7 12/02/2021    ALKPHOS 70 12/02/2021    AST 30 12/02/2021    ALT 34 12/02/2021    LABGLOM >60 12/03/2021    GFRAA >60 12/03/2021       Scheduled Meds:   cephALEXin, 500 mg, Oral, 4 times per day    sodium chloride flush, 5-40 mL, IntraVENous, 2 times per day    melatonin, 3 mg, Oral, Nightly    ARIPiprazole, 10 mg, Oral, Daily    benztropine, 1 mg, Oral, Daily    cariprazine hcl, 4.5 mg, Oral, Daily    gabapentin, 600 mg, Oral, 4x daily    lithium, 600 mg, Oral, Daily    lithium, 300 mg, Oral, Nightly    propranolol, 10 mg, Oral, TID    risperiDONE, 1 mg, Oral, BID    venlafaxine, 150 mg, Oral, Daily    Physical Exam:  Physical Exam  Constitutional:       Appearance: He is well-developed. HENT:      Head: Normocephalic. Eyes:      Pupils: Pupils are equal, round, and reactive to light. Cardiovascular:      Rate and Rhythm: Normal rate. Pulmonary:      Effort: Pulmonary effort is normal.   Abdominal:      General: There is no distension. Palpations: Abdomen is soft. There is no mass. Tenderness: There is no abdominal tenderness. There is no guarding or rebound. Musculoskeletal:         General: Normal range of motion. Cervical back: Normal range of motion and neck supple. Skin:     General: Skin is warm. Comments: Dressing intact with bloody drainage as expected. Penrose drain in place between 2 incisions. Iodoform packing in place to both incisions. Neurological:      Mental Status: He is alert and oriented to person, place, and time. Assessment and Plan:    Patient Active Problem List:     Bipolar affective disorder ([facility]     Osteoarthritis of lumbar spine     Heavy smoker (more than 20 cigarettes per day)     Cannabis abuse      history of bladder cancer     Restless leg syndrome     Abscess of left thigh      Diet: As tolerated. Wound care: Daily dressing and packing changes. Remove old packing.  Wash thigh with soap and water. Once dry, reapply 1/2\" iodoform packing to each of the incisions, following the tunneling. Cover with ABDs and kerlix wrap. Drains: Keep penrose drain in place. Activity: As tolerated  Abx: OK to d/c on PO abx - Per hospitalist will go with Keflex x 7d  Med changes: None   Labs/Imaging: Reviewed  Disposition: OK to d/c from surgical standpoint. Will need dressing changes daily at home. Pt reports that he and his girlfriend will be able to manage without HHC. Will f/u in our office next week.        Tom Arizmendi PA-C

## 2021-12-05 LAB
CULTURE: NORMAL
CULTURE: NORMAL
Lab: NORMAL
Lab: NORMAL
SPECIMEN: NORMAL
SPECIMEN: NORMAL

## 2021-12-06 LAB
CULTURE: ABNORMAL
Lab: ABNORMAL
Lab: ABNORMAL
SPECIMEN: ABNORMAL
SPECIMEN: ABNORMAL

## 2021-12-09 ENCOUNTER — OFFICE VISIT (OUTPATIENT)
Dept: SURGERY | Age: 46
End: 2021-12-09

## 2021-12-09 VITALS
HEIGHT: 76 IN | OXYGEN SATURATION: 95 % | WEIGHT: 270.4 LBS | HEART RATE: 91 BPM | SYSTOLIC BLOOD PRESSURE: 136 MMHG | BODY MASS INDEX: 32.93 KG/M2 | DIASTOLIC BLOOD PRESSURE: 82 MMHG

## 2021-12-09 DIAGNOSIS — L02.416 ABSCESS OF LEFT THIGH: Primary | ICD-10-CM

## 2021-12-09 PROCEDURE — 99024 POSTOP FOLLOW-UP VISIT: CPT | Performed by: PHYSICIAN ASSISTANT

## 2021-12-09 PROCEDURE — APPNB30 APP NON BILLABLE TIME 0-30 MINS: Performed by: PHYSICIAN ASSISTANT

## 2021-12-09 ASSESSMENT — PATIENT HEALTH QUESTIONNAIRE - PHQ9
SUM OF ALL RESPONSES TO PHQ QUESTIONS 1-9: 0
1. LITTLE INTEREST OR PLEASURE IN DOING THINGS: 0
SUM OF ALL RESPONSES TO PHQ QUESTIONS 1-9: 0
2. FEELING DOWN, DEPRESSED OR HOPELESS: 0
SUM OF ALL RESPONSES TO PHQ9 QUESTIONS 1 & 2: 0
SUM OF ALL RESPONSES TO PHQ QUESTIONS 1-9: 0

## 2021-12-20 ENCOUNTER — OFFICE VISIT (OUTPATIENT)
Dept: SURGERY | Age: 46
End: 2021-12-20

## 2021-12-20 VITALS
OXYGEN SATURATION: 92 % | DIASTOLIC BLOOD PRESSURE: 78 MMHG | WEIGHT: 271.7 LBS | BODY MASS INDEX: 33.09 KG/M2 | SYSTOLIC BLOOD PRESSURE: 118 MMHG | HEIGHT: 76 IN | HEART RATE: 76 BPM

## 2021-12-20 DIAGNOSIS — Z48.89 ENCOUNTER FOR POSTOPERATIVE CARE: Primary | ICD-10-CM

## 2021-12-20 DIAGNOSIS — L02.416 ABSCESS OF LEFT THIGH: ICD-10-CM

## 2021-12-20 PROCEDURE — 99024 POSTOP FOLLOW-UP VISIT: CPT | Performed by: PHYSICIAN ASSISTANT

## 2021-12-20 ASSESSMENT — PATIENT HEALTH QUESTIONNAIRE - PHQ9
SUM OF ALL RESPONSES TO PHQ9 QUESTIONS 1 & 2: 0
SUM OF ALL RESPONSES TO PHQ QUESTIONS 1-9: 0
SUM OF ALL RESPONSES TO PHQ QUESTIONS 1-9: 0
1. LITTLE INTEREST OR PLEASURE IN DOING THINGS: 0
SUM OF ALL RESPONSES TO PHQ QUESTIONS 1-9: 0
2. FEELING DOWN, DEPRESSED OR HOPELESS: 0

## 2021-12-20 NOTE — PROGRESS NOTES
Chief Complaint   Patient presents with    Post-Op Check     2ND PO I&D LEFT LEG [facility] 12/1/21        SUBJECTIVE:  Patient presents today for his 2nd post op visit following left thigh I&D. Pt reports that pain is none. Pt has been performing local wound care with daily dressing and packing changes. Reports that medial incision is no longer able to be packed. The other incision is taking minimal packing. There is minimal drainage. Denies fevers, chills, erythema or swelling.      Past Surgical History:   Procedure Laterality Date    BLADDER SURGERY      States had bladder cancer surgery    LEG SURGERY Left 12/1/2021    LEFT LEG DEBRIDEMENT INCISION AND DRAINAGE performed by Tanisha Sexton MD at [place] [address] TUMOR REMOVAL  2008    bladder S/P cancer     Past Medical History:   Diagnosis Date    Anxiety     Back pain     Bipolar 1 disorder ([address]     Bipolar affect, depressed ([address]     Bipolar disorder ([address]     Bladder cancer ([address]     Cancer ([address]     bladder    Depression     Hepatitis C     Herniated disc     lumbar    Osteoarthritis     Psychosis ([address]     Schizoaffective disorder ([address]     Schizophrenia ([address]     Scoliosis      Family History   Problem Relation Age of Onset    Diabetes Mother     Cancer Mother         lung    Heart Disease Mother     Seizures Mother     Seizures Sister     Cancer Maternal Uncle         lung    Cancer Maternal Grandmother         lung    Cancer Maternal Uncle         lung     Social History     Socioeconomic History    Marital status: Single     Spouse name: Hector Shipley Number of children: 1    Years of education: 15    Highest education level: Not on file   Occupational History    Occupation: unemployed   Tobacco Use    Smoking status: Current Every Day Smoker     Packs/day: 1.50     Years: 22.00     Pack years: 33.00     Types: Cigarettes    Smokeless tobacco: Never Used   Vaping Use    Vaping Use: Never used   Substance and Sexual Activity    Alcohol use: No    Drug use: Yes     Frequency: 1.0 times per week     Types: Marijuana (Weed), IV, Methamphetamines (Crystal Meth)     Comment: fentanyl    Sexual activity: Yes   Other Topics Concern    Not on file   Social History Narrative    ** Merged History Encounter **          Social Determinants of Health     Financial Resource Strain:     Difficulty of Paying Living Expenses: Not on file   Food Insecurity:     Worried About Running Out of Food in the Last Year: Not on file    Nahed of Food in the Last Year: Not on file   Transportation Needs:     Lack of Transportation (Medical): Not on file    Lack of Transportation (Non-Medical): Not on file   Physical Activity:     Days of Exercise per Week: Not on file    Minutes of Exercise per Session: Not on file   Stress:     Feeling of Stress : Not on file   Social Connections:     Frequency of Communication with Friends and Family: Not on file    Frequency of Social Gatherings with Friends and Family: Not on file    Attends Anabaptism Services: Not on file    Active Member of [address] [place] [address] or Organizations: Not on file    Attends Club or Organization Meetings: Not on file    Marital Status: Not on file   Intimate Partner Violence:     Fear of Current or Ex-Partner: Not on file    Emotionally Abused: Not on file    Physically Abused: Not on file    Sexually Abused: Not on file   Housing Stability:     Unable to Pay for Housing in the Last Year: Not on file    Number of [place] in the Last Year: Not on file    Unstable Housing in the Last Year: Not on file       OBJECTIVE:  Physical Exam  Constitutional:       Appearance: He is well-developed. HENT:      Head: Normocephalic. Eyes:      Pupils: Pupils are equal, round, and reactive to light. Cardiovascular:      Rate and Rhythm: Normal rate. Pulmonary:      Effort: Pulmonary effort is normal.   Abdominal:      General: There is no distension. Palpations: Abdomen is soft.  There is no mass. Tenderness: There is no abdominal tenderness. There is no guarding or rebound. Musculoskeletal:         General: Normal range of motion. Cervical back: Normal range of motion and neck supple. Legs:       Comments: Incisions healing well. There is no tracking wound any longer. No signs of infection. Skin:     General: Skin is warm. Neurological:      Mental Status: He is alert and oriented to person, place, and time. ASSESSMENT:  Patient doing well on this post operative check. Wounds healing well. 1. Encounter for postoperative care    2. Abscess of left thigh        PLAN:  Pt is to increase activities as tolerated. Will continue local wound care with arnulfo and a bandage over each incision. No orders of the defined types were placed in this encounter. No orders of the defined types were placed in this encounter. Follow Up: Return if symptoms worsen or fail to improve.     Kira Salazar PA-C

## 2021-12-21 NOTE — PROGRESS NOTES
Chief Complaint   Patient presents with    Post-Op Check         SUBJECTIVE:  Patient presents today for his 1st post op visit following left thigh abscess I&D. Pt reports that pain is mild. Pt denies fever or chills. He and his girlfriend are performing dresing and packing changes daily as directed. Penrose drain intact. Denies any fevers or chills. Has completed his abx course. Does continue to have some drainage from wound.      Past Surgical History:   Procedure Laterality Date    BLADDER SURGERY      States had bladder cancer surgery    LEG SURGERY Left 12/1/2021    LEFT LEG DEBRIDEMENT INCISION AND DRAINAGE performed by Joy Cuello MD at [place] [address] TUMOR REMOVAL  2008    bladder S/P cancer     Past Medical History:   Diagnosis Date    Anxiety     Back pain     Bipolar 1 disorder ([address]     Bipolar affect, depressed ([address]     Bipolar disorder ([address]     Bladder cancer ([address]     Cancer ([address]     bladder    Depression     Hepatitis C     Herniated disc     lumbar    Osteoarthritis     Psychosis ([address]     Schizoaffective disorder ([address]     Schizophrenia ([address]     Scoliosis      Family History   Problem Relation Age of Onset    Diabetes Mother     Cancer Mother         lung    Heart Disease Mother     Seizures Mother     Seizures Sister     Cancer Maternal Uncle         lung    Cancer Maternal Grandmother         lung    Cancer Maternal Uncle         lung     Social History     Socioeconomic History    Marital status: Single     Spouse name: Raymond Joyner Number of children: 3    Years of education: 15    Highest education level: Not on file   Occupational History    Occupation: unemployed   Tobacco Use    Smoking status: Current Every Day Smoker     Packs/day: 1.50     Years: 22.00     Pack years: 33.00     Types: Cigarettes    Smokeless tobacco: Never Used   Vaping Use    Vaping Use: Never used   Substance and Sexual Activity    Alcohol use: No    Drug use: Yes     Frequency: tenderness. There is no guarding or rebound. Musculoskeletal:         General: Normal range of motion. Cervical back: Normal range of motion and neck supple. Legs:       Comments: 2 incisions with iodoform packing in place and penrose drain. There is surrounding erythema and mild induration that is improved since last examined in the hospital.    Skin:     General: Skin is warm. Neurological:      Mental Status: He is alert and oriented to person, place, and time. Path reveals:   Culture  Abnormal   STAPHYLOCOCCUS AUREUS Light growth No further workup Refer to culture [id number] ([id number] 11/30/21) for sensitivity results            ASSESSMENT:  Patient doing well on this post operative check. Wounds improving. 1. Abscess of left thigh        PLAN:  Penrose drain removed today without difficulty. Pt tolerated well. Pt is to increase activities as tolerated. Continue local wound care with iodoform packing and ABD. No orders of the defined types were placed in this encounter. No orders of the defined types were placed in this encounter. Follow Up: Return in about 2 weeks (around 12/23/2021).     Navdeep Carolina PA-C

## 2023-06-24 ENCOUNTER — HOSPITAL ENCOUNTER (EMERGENCY)
Age: 48
Discharge: HOME | End: 2023-06-24
Payer: COMMERCIAL

## 2023-06-24 ENCOUNTER — APPOINTMENT (OUTPATIENT)
Dept: CT IMAGING | Age: 48
End: 2023-06-24
Payer: COMMERCIAL

## 2023-06-24 VITALS
BODY MASS INDEX: 34.1 KG/M2 | HEIGHT: 76 IN | HEART RATE: 79 BPM | DIASTOLIC BLOOD PRESSURE: 87 MMHG | RESPIRATION RATE: 20 BRPM | SYSTOLIC BLOOD PRESSURE: 144 MMHG | WEIGHT: 280 LBS | TEMPERATURE: 98.3 F | OXYGEN SATURATION: 97 %

## 2023-06-24 DIAGNOSIS — T14.8XXA PAIN DUE TO FRACTURE: ICD-10-CM

## 2023-06-24 DIAGNOSIS — S22.32XA CLOSED TRAUMATIC NONDISPLACED FRACTURE OF ONE RIB OF LEFT SIDE, INITIAL ENCOUNTER: Primary | ICD-10-CM

## 2023-06-24 DIAGNOSIS — R07.89 LEFT-SIDED CHEST WALL PAIN: ICD-10-CM

## 2023-06-24 PROCEDURE — 89220 SPUTUM SPECIMEN COLLECTION: CPT

## 2023-06-24 PROCEDURE — 94664 DEMO&/EVAL PT USE INHALER: CPT

## 2023-06-24 PROCEDURE — 71250 CT THORAX DX C-: CPT

## 2023-06-24 PROCEDURE — 99284 EMERGENCY DEPT VISIT MOD MDM: CPT

## 2023-06-24 PROCEDURE — 6370000000 HC RX 637 (ALT 250 FOR IP): Performed by: PHYSICIAN ASSISTANT

## 2023-06-24 PROCEDURE — 94150 VITAL CAPACITY TEST: CPT

## 2023-06-24 RX ORDER — HYDROCODONE BITARTRATE AND ACETAMINOPHEN 5; 325 MG/1; MG/1
2 TABLET ORAL ONCE
Status: COMPLETED | OUTPATIENT
Start: 2023-06-24 | End: 2023-06-24

## 2023-06-24 RX ORDER — LIDOCAINE 50 MG/G
1 PATCH TOPICAL DAILY
Qty: 10 PATCH | Refills: 0 | Status: ON HOLD | OUTPATIENT
Start: 2023-06-24 | End: 2023-07-04

## 2023-06-24 RX ORDER — MORPHINE SULFATE 15 MG/1
15 TABLET ORAL EVERY 6 HOURS PRN
Qty: 8 TABLET | Refills: 0 | Status: SHIPPED | OUTPATIENT
Start: 2023-06-24 | End: 2023-06-26

## 2023-06-24 RX ORDER — IBUPROFEN 600 MG/1
600 TABLET ORAL 3 TIMES DAILY PRN
Qty: 30 TABLET | Refills: 0 | Status: SHIPPED | OUTPATIENT
Start: 2023-06-24 | End: 2023-06-29

## 2023-06-24 RX ORDER — IBUPROFEN 600 MG/1
600 TABLET ORAL ONCE
Status: COMPLETED | OUTPATIENT
Start: 2023-06-24 | End: 2023-06-24

## 2023-06-24 RX ADMIN — IBUPROFEN 600 MG: 600 TABLET, FILM COATED ORAL at 15:10

## 2023-06-24 RX ADMIN — HYDROCODONE BITARTRATE AND ACETAMINOPHEN 2 TABLET: 5; 325 TABLET ORAL at 15:10

## 2023-06-24 ASSESSMENT — PAIN SCALES - GENERAL
PAINLEVEL_OUTOF10: 8
PAINLEVEL_OUTOF10: 8
PAINLEVEL_OUTOF10: 10

## 2023-06-24 ASSESSMENT — PAIN DESCRIPTION - ORIENTATION: ORIENTATION: LEFT

## 2023-06-24 ASSESSMENT — PAIN DESCRIPTION - FREQUENCY: FREQUENCY: CONTINUOUS

## 2023-06-24 ASSESSMENT — PAIN DESCRIPTION - LOCATION: LOCATION: RIB CAGE

## 2023-06-24 NOTE — DISCHARGE INSTRUCTIONS
Follow-up with your primary care provider for reevaluation of your pain and discuss your rib fracture. Follow-up as well for reevaluation of your blood pressure which was elevated today. Return to emergency department with any fever, confusion, coughing up blood, difficulty breathing, passing out, lower extremity pain or swelling, worsening symptoms or any new concerns. Use the incentive spirometer as directed today. To Help with pain relief, use the prescription or over-the-counter ibuprofen, over-the-counter Tylenol taking 1000 mg every 6 hours, topical lidocaine patch. If you need additional pain relief, you could use the prescription pain medicine. Avoid using the pain medication with other sedating medications, avoid  you illicit drug use while on this medication.

## 2023-06-24 NOTE — ED PROVIDER NOTES
Oral Given 6/24/23 1510)     Disposition: Discussed need to follow up diagnostics, including incidental findings. Discharged with instructions to obtain outpatient follow up of patient's symptoms and findings, with strict return precautions if patient develops new or worsening symptoms. Follow-up plan and return precautions were provided and discussed in detail patient in agreement. I am the Primary Clinician of Record. This patient was evaluated, managed, and treated in the context of the COVID-19 pandemic. As such, associated resources were utilized in his care. I did don/doff appropriate PPE (including N95 mask, safety glasses, gown, gloves), as recommended by the health facility/national standard best practice, during my bedside interactions with the patient. The patient tolerated their visit well. I evaluated the patient. The physician was available for consultation as needed. The patient and / or the family were informed of the results of any tests, a time was given to answer questions, a plan was proposed and they agreed with plan. CLINICAL IMPRESSION:  1. Closed traumatic nondisplaced fracture of one rib of left side, initial encounter    2. Left-sided chest wall pain    3. Pain due to fracture        DISPOSITION        PATIENT REFERRED TO:  ANA M Vora NP  [address] [place] [address]  [phone]            DISCHARGE MEDICATIONS:  New Prescriptions    IBUPROFEN (ADVIL;MOTRIN) 600 MG TABLET    Take 1 tablet by mouth 3 times daily as needed for Pain    LIDOCAINE (LIDODERM) 5 %    Place 1 patch onto the skin daily for 10 days 12 hours on, 12 hours off. MORPHINE (MSIR) 15 MG TABLET    Take 1 tablet by mouth every 6 hours as needed for Pain for up to 2 days.  Max Daily Amount: 60 mg       DISCONTINUED MEDICATIONS:  Discontinued Medications    IBUPROFEN (ADVIL;MOTRIN) 800 MG TABLET    Take 1 tablet by mouth every 8 hours as needed for Pain              (Please

## 2023-06-25 ASSESSMENT — ENCOUNTER SYMPTOMS
CHEST TIGHTNESS: 0
CHOKING: 0
SHORTNESS OF BREATH: 0

## 2023-06-29 ENCOUNTER — APPOINTMENT (OUTPATIENT)
Dept: CT IMAGING | Age: 48
DRG: 383 | End: 2023-06-29
Payer: COMMERCIAL

## 2023-06-29 ENCOUNTER — APPOINTMENT (OUTPATIENT)
Dept: GENERAL RADIOLOGY | Age: 48
DRG: 383 | End: 2023-06-29
Payer: COMMERCIAL

## 2023-06-29 ENCOUNTER — HOSPITAL ENCOUNTER (INPATIENT)
Age: 48
LOS: 4 days | Discharge: LEFT BEFORE DISCHARGE | DRG: 383 | End: 2023-07-03
Attending: EMERGENCY MEDICINE | Admitting: INTERNAL MEDICINE
Payer: COMMERCIAL

## 2023-06-29 DIAGNOSIS — L03.116 CELLULITIS AND ABSCESS OF LEFT LOWER EXTREMITY: Primary | ICD-10-CM

## 2023-06-29 DIAGNOSIS — F19.90 ACTIVE INTRAVENOUS DRUG USE: ICD-10-CM

## 2023-06-29 DIAGNOSIS — L02.416 CELLULITIS AND ABSCESS OF LEFT LOWER EXTREMITY: Primary | ICD-10-CM

## 2023-06-29 PROBLEM — L02.91 ABSCESS: Status: ACTIVE | Noted: 2023-06-29

## 2023-06-29 LAB
ALBUMIN SERPL-MCNC: 3.4 GM/DL (ref 3.4–5)
ALP BLD-CCNC: 79 IU/L (ref 40–128)
ALT SERPL-CCNC: 37 U/L (ref 10–40)
ANION GAP SERPL CALCULATED.3IONS-SCNC: 11 MMOL/L (ref 4–16)
AST SERPL-CCNC: 29 IU/L (ref 15–37)
BASOPHILS ABSOLUTE: 0 K/CU MM
BASOPHILS RELATIVE PERCENT: 0.4 % (ref 0–1)
BILIRUB SERPL-MCNC: 0.5 MG/DL (ref 0–1)
BUN SERPL-MCNC: 7 MG/DL (ref 6–23)
CALCIUM SERPL-MCNC: 8.8 MG/DL (ref 8.3–10.6)
CHLORIDE BLD-SCNC: 104 MMOL/L (ref 99–110)
CO2: 21 MMOL/L (ref 21–32)
CREAT SERPL-MCNC: 1 MG/DL (ref 0.9–1.3)
DIFFERENTIAL TYPE: ABNORMAL
EOSINOPHILS ABSOLUTE: 0.3 K/CU MM
EOSINOPHILS RELATIVE PERCENT: 3.3 % (ref 0–3)
GFR SERPL CREATININE-BSD FRML MDRD: >60 ML/MIN/1.73M2
GLUCOSE SERPL-MCNC: 142 MG/DL (ref 70–99)
HCT VFR BLD CALC: 44.6 % (ref 42–52)
HEMOGLOBIN: 14.2 GM/DL (ref 13.5–18)
IMMATURE NEUTROPHIL %: 0.3 % (ref 0–0.43)
LACTIC ACID, SEPSIS: 0.9 MMOL/L (ref 0.5–1.9)
LACTIC ACID, SEPSIS: 2 MMOL/L (ref 0.5–1.9)
LYMPHOCYTES ABSOLUTE: 2.3 K/CU MM
LYMPHOCYTES RELATIVE PERCENT: 25.3 % (ref 24–44)
MCH RBC QN AUTO: 30 PG (ref 27–31)
MCHC RBC AUTO-ENTMCNC: 31.8 % (ref 32–36)
MCV RBC AUTO: 94.1 FL (ref 78–100)
MONOCYTES ABSOLUTE: 0.7 K/CU MM
MONOCYTES RELATIVE PERCENT: 7.1 % (ref 0–4)
NUCLEATED RBC %: 0 %
PDW BLD-RTO: 12.5 % (ref 11.7–14.9)
PLATELET # BLD: 220 K/CU MM (ref 140–440)
PMV BLD AUTO: 9.2 FL (ref 7.5–11.1)
POTASSIUM SERPL-SCNC: 4 MMOL/L (ref 3.5–5.1)
RBC # BLD: 4.74 M/CU MM (ref 4.6–6.2)
SEGMENTED NEUTROPHILS ABSOLUTE COUNT: 5.8 K/CU MM
SEGMENTED NEUTROPHILS RELATIVE PERCENT: 63.6 % (ref 36–66)
SODIUM BLD-SCNC: 136 MMOL/L (ref 135–145)
TOTAL CK: 61 IU/L (ref 38–174)
TOTAL IMMATURE NEUTOROPHIL: 0.03 K/CU MM
TOTAL NUCLEATED RBC: 0 K/CU MM
TOTAL PROTEIN: 6.7 GM/DL (ref 6.4–8.2)
WBC # BLD: 9.1 K/CU MM (ref 4–10.5)

## 2023-06-29 PROCEDURE — 6370000000 HC RX 637 (ALT 250 FOR IP): Performed by: INTERNAL MEDICINE

## 2023-06-29 PROCEDURE — 99285 EMERGENCY DEPT VISIT HI MDM: CPT

## 2023-06-29 PROCEDURE — 6360000002 HC RX W HCPCS: Performed by: INTERNAL MEDICINE

## 2023-06-29 PROCEDURE — 6360000002 HC RX W HCPCS: Performed by: PHYSICIAN ASSISTANT

## 2023-06-29 PROCEDURE — 6360000004 HC RX CONTRAST MEDICATION: Performed by: PHYSICIAN ASSISTANT

## 2023-06-29 PROCEDURE — 1200000000 HC SEMI PRIVATE

## 2023-06-29 PROCEDURE — 96375 TX/PRO/DX INJ NEW DRUG ADDON: CPT

## 2023-06-29 PROCEDURE — 6370000000 HC RX 637 (ALT 250 FOR IP): Performed by: EMERGENCY MEDICINE

## 2023-06-29 PROCEDURE — 2580000003 HC RX 258: Performed by: PHYSICIAN ASSISTANT

## 2023-06-29 PROCEDURE — 76937 US GUIDE VASCULAR ACCESS: CPT

## 2023-06-29 PROCEDURE — 85025 COMPLETE CBC W/AUTO DIFF WBC: CPT

## 2023-06-29 PROCEDURE — 73701 CT LOWER EXTREMITY W/DYE: CPT

## 2023-06-29 PROCEDURE — 6360000002 HC RX W HCPCS: Performed by: EMERGENCY MEDICINE

## 2023-06-29 PROCEDURE — 83605 ASSAY OF LACTIC ACID: CPT

## 2023-06-29 PROCEDURE — 73590 X-RAY EXAM OF LOWER LEG: CPT

## 2023-06-29 PROCEDURE — 87040 BLOOD CULTURE FOR BACTERIA: CPT

## 2023-06-29 PROCEDURE — 82550 ASSAY OF CK (CPK): CPT

## 2023-06-29 PROCEDURE — 96365 THER/PROPH/DIAG IV INF INIT: CPT

## 2023-06-29 PROCEDURE — 80053 COMPREHEN METABOLIC PANEL: CPT

## 2023-06-29 RX ORDER — SODIUM CHLORIDE 9 MG/ML
INJECTION, SOLUTION INTRAVENOUS CONTINUOUS
Status: DISCONTINUED | OUTPATIENT
Start: 2023-06-29 | End: 2023-07-01

## 2023-06-29 RX ORDER — 0.9 % SODIUM CHLORIDE 0.9 %
1000 INTRAVENOUS SOLUTION INTRAVENOUS ONCE
Status: COMPLETED | OUTPATIENT
Start: 2023-06-29 | End: 2023-06-29

## 2023-06-29 RX ORDER — RISPERIDONE 0.5 MG/1
1 TABLET ORAL 2 TIMES DAILY
Status: DISCONTINUED | OUTPATIENT
Start: 2023-06-29 | End: 2023-07-03 | Stop reason: HOSPADM

## 2023-06-29 RX ORDER — ACETAMINOPHEN 325 MG/1
650 TABLET ORAL ONCE
Status: COMPLETED | OUTPATIENT
Start: 2023-06-29 | End: 2023-06-29

## 2023-06-29 RX ORDER — BUPRENORPHINE 2 MG/1
4 TABLET SUBLINGUAL PRN
Status: DISCONTINUED | OUTPATIENT
Start: 2023-06-29 | End: 2023-07-03 | Stop reason: HOSPADM

## 2023-06-29 RX ORDER — PROMETHAZINE HYDROCHLORIDE 25 MG/1
25 TABLET ORAL EVERY 6 HOURS PRN
Status: DISCONTINUED | OUTPATIENT
Start: 2023-06-29 | End: 2023-07-03 | Stop reason: HOSPADM

## 2023-06-29 RX ORDER — KETOROLAC TROMETHAMINE 30 MG/ML
30 INJECTION, SOLUTION INTRAMUSCULAR; INTRAVENOUS ONCE
Status: COMPLETED | OUTPATIENT
Start: 2023-06-29 | End: 2023-06-29

## 2023-06-29 RX ORDER — ONDANSETRON 2 MG/ML
4 INJECTION INTRAMUSCULAR; INTRAVENOUS EVERY 6 HOURS PRN
Status: DISCONTINUED | OUTPATIENT
Start: 2023-06-29 | End: 2023-07-03 | Stop reason: HOSPADM

## 2023-06-29 RX ORDER — DOXEPIN HYDROCHLORIDE 3 MG/1
3 TABLET ORAL NIGHTLY
Status: DISCONTINUED | OUTPATIENT
Start: 2023-06-29 | End: 2023-07-03 | Stop reason: HOSPADM

## 2023-06-29 RX ORDER — ACETAMINOPHEN 325 MG/1
650 TABLET ORAL EVERY 6 HOURS PRN
Status: DISCONTINUED | OUTPATIENT
Start: 2023-06-29 | End: 2023-07-03 | Stop reason: HOSPADM

## 2023-06-29 RX ORDER — GABAPENTIN 300 MG/1
600 CAPSULE ORAL 3 TIMES DAILY
Status: DISCONTINUED | OUTPATIENT
Start: 2023-06-29 | End: 2023-07-03 | Stop reason: HOSPADM

## 2023-06-29 RX ORDER — SODIUM CHLORIDE 0.9 % (FLUSH) 0.9 %
5-40 SYRINGE (ML) INJECTION PRN
Status: DISCONTINUED | OUTPATIENT
Start: 2023-06-29 | End: 2023-07-03 | Stop reason: HOSPADM

## 2023-06-29 RX ORDER — CLONIDINE HYDROCHLORIDE 0.1 MG/1
0.1 TABLET ORAL EVERY 6 HOURS PRN
Status: DISCONTINUED | OUTPATIENT
Start: 2023-06-29 | End: 2023-07-03 | Stop reason: HOSPADM

## 2023-06-29 RX ORDER — FAMOTIDINE 20 MG/1
20 TABLET, FILM COATED ORAL 2 TIMES DAILY
Status: DISCONTINUED | OUTPATIENT
Start: 2023-06-29 | End: 2023-07-03 | Stop reason: HOSPADM

## 2023-06-29 RX ORDER — DICYCLOMINE HCL 20 MG
20 TABLET ORAL EVERY 6 HOURS PRN
Status: DISCONTINUED | OUTPATIENT
Start: 2023-06-29 | End: 2023-07-03 | Stop reason: HOSPADM

## 2023-06-29 RX ORDER — DOXEPIN HYDROCHLORIDE 3 MG/1
3 TABLET ORAL NIGHTLY
COMMUNITY

## 2023-06-29 RX ORDER — OLANZAPINE 5 MG/1
5 TABLET ORAL NIGHTLY
Status: DISCONTINUED | OUTPATIENT
Start: 2023-06-29 | End: 2023-07-03 | Stop reason: HOSPADM

## 2023-06-29 RX ORDER — BENZTROPINE MESYLATE 1 MG/1
1 TABLET ORAL DAILY
Status: DISCONTINUED | OUTPATIENT
Start: 2023-06-30 | End: 2023-07-03 | Stop reason: HOSPADM

## 2023-06-29 RX ORDER — OLANZAPINE 5 MG/1
5 TABLET ORAL NIGHTLY
COMMUNITY

## 2023-06-29 RX ORDER — LOPERAMIDE HYDROCHLORIDE 2 MG/1
2 CAPSULE ORAL 4 TIMES DAILY PRN
Status: DISCONTINUED | OUTPATIENT
Start: 2023-06-29 | End: 2023-07-03 | Stop reason: HOSPADM

## 2023-06-29 RX ORDER — FAMOTIDINE 20 MG/1
20 TABLET, FILM COATED ORAL 2 TIMES DAILY
COMMUNITY

## 2023-06-29 RX ORDER — SODIUM CHLORIDE 0.9 % (FLUSH) 0.9 %
5-40 SYRINGE (ML) INJECTION EVERY 12 HOURS SCHEDULED
Status: DISCONTINUED | OUTPATIENT
Start: 2023-06-29 | End: 2023-07-03 | Stop reason: HOSPADM

## 2023-06-29 RX ORDER — SODIUM CHLORIDE 9 MG/ML
INJECTION, SOLUTION INTRAVENOUS PRN
Status: DISCONTINUED | OUTPATIENT
Start: 2023-06-29 | End: 2023-07-03 | Stop reason: HOSPADM

## 2023-06-29 RX ORDER — ONDANSETRON 2 MG/ML
4 INJECTION INTRAMUSCULAR; INTRAVENOUS EVERY 30 MIN PRN
Status: DISCONTINUED | OUTPATIENT
Start: 2023-06-29 | End: 2023-07-03 | Stop reason: HOSPADM

## 2023-06-29 RX ORDER — ACETAMINOPHEN 650 MG/1
650 SUPPOSITORY RECTAL EVERY 6 HOURS PRN
Status: DISCONTINUED | OUTPATIENT
Start: 2023-06-29 | End: 2023-07-03 | Stop reason: HOSPADM

## 2023-06-29 RX ORDER — ENOXAPARIN SODIUM 100 MG/ML
30 INJECTION SUBCUTANEOUS 2 TIMES DAILY
Status: DISCONTINUED | OUTPATIENT
Start: 2023-06-29 | End: 2023-07-03 | Stop reason: HOSPADM

## 2023-06-29 RX ORDER — ONDANSETRON 4 MG/1
4 TABLET, ORALLY DISINTEGRATING ORAL EVERY 8 HOURS PRN
Status: DISCONTINUED | OUTPATIENT
Start: 2023-06-29 | End: 2023-07-03 | Stop reason: HOSPADM

## 2023-06-29 RX ORDER — HYDROXYZINE PAMOATE 25 MG/1
50 CAPSULE ORAL EVERY 8 HOURS PRN
Status: DISCONTINUED | OUTPATIENT
Start: 2023-06-29 | End: 2023-07-03 | Stop reason: HOSPADM

## 2023-06-29 RX ORDER — POLYETHYLENE GLYCOL 3350 17 G/17G
17 POWDER, FOR SOLUTION ORAL DAILY PRN
Status: DISCONTINUED | OUTPATIENT
Start: 2023-06-29 | End: 2023-07-03 | Stop reason: HOSPADM

## 2023-06-29 RX ADMIN — SODIUM CHLORIDE: 9 INJECTION, SOLUTION INTRAVENOUS at 19:21

## 2023-06-29 RX ADMIN — OLANZAPINE 5 MG: 5 TABLET, FILM COATED ORAL at 23:39

## 2023-06-29 RX ADMIN — ACETAMINOPHEN 650 MG: 325 TABLET ORAL at 20:17

## 2023-06-29 RX ADMIN — KETOROLAC TROMETHAMINE 30 MG: 30 INJECTION, SOLUTION INTRAMUSCULAR; INTRAVENOUS at 17:53

## 2023-06-29 RX ADMIN — ONDANSETRON 4 MG: 2 INJECTION INTRAMUSCULAR; INTRAVENOUS at 17:53

## 2023-06-29 RX ADMIN — FAMOTIDINE 20 MG: 20 TABLET ORAL at 22:25

## 2023-06-29 RX ADMIN — CEFEPIME 2000 MG: 2 INJECTION, POWDER, FOR SOLUTION INTRAVENOUS at 17:58

## 2023-06-29 RX ADMIN — IOPAMIDOL 80 ML: 755 INJECTION, SOLUTION INTRAVENOUS at 18:20

## 2023-06-29 RX ADMIN — ENOXAPARIN SODIUM 30 MG: 100 INJECTION SUBCUTANEOUS at 22:25

## 2023-06-29 RX ADMIN — GABAPENTIN 600 MG: 300 CAPSULE ORAL at 22:25

## 2023-06-29 RX ADMIN — VANCOMYCIN HYDROCHLORIDE 2500 MG: 1 INJECTION, POWDER, LYOPHILIZED, FOR SOLUTION INTRAVENOUS at 19:21

## 2023-06-29 RX ADMIN — RISPERIDONE 1 MG: 0.5 TABLET ORAL at 23:39

## 2023-06-29 RX ADMIN — SODIUM CHLORIDE 1000 ML: 9 INJECTION, SOLUTION INTRAVENOUS at 17:58

## 2023-06-29 RX ADMIN — KETOROLAC TROMETHAMINE 30 MG: 30 INJECTION, SOLUTION INTRAMUSCULAR; INTRAVENOUS at 20:32

## 2023-06-29 ASSESSMENT — PAIN DESCRIPTION - DESCRIPTORS
DESCRIPTORS: BURNING

## 2023-06-29 ASSESSMENT — PAIN DESCRIPTION - ORIENTATION
ORIENTATION: LEFT

## 2023-06-29 ASSESSMENT — LIFESTYLE VARIABLES
HOW MANY STANDARD DRINKS CONTAINING ALCOHOL DO YOU HAVE ON A TYPICAL DAY: PATIENT DOES NOT DRINK
HOW OFTEN DO YOU HAVE A DRINK CONTAINING ALCOHOL: NEVER

## 2023-06-29 ASSESSMENT — PAIN SCALES - GENERAL
PAINLEVEL_OUTOF10: 6
PAINLEVEL_OUTOF10: 8

## 2023-06-29 ASSESSMENT — ENCOUNTER SYMPTOMS
SORE THROAT: 0
NAUSEA: 0
SHORTNESS OF BREATH: 0
EYE DISCHARGE: 0
CHEST TIGHTNESS: 0
VOMITING: 0
COLOR CHANGE: 0
EYE REDNESS: 0
ABDOMINAL DISTENTION: 0

## 2023-06-29 ASSESSMENT — PAIN DESCRIPTION - LOCATION
LOCATION: LEG

## 2023-06-30 LAB
ANION GAP SERPL CALCULATED.3IONS-SCNC: 11 MMOL/L (ref 4–16)
BASOPHILS ABSOLUTE: 0 K/CU MM
BASOPHILS RELATIVE PERCENT: 0.4 % (ref 0–1)
BUN SERPL-MCNC: 8 MG/DL (ref 6–23)
CALCIUM SERPL-MCNC: 7.9 MG/DL (ref 8.3–10.6)
CHLORIDE BLD-SCNC: 107 MMOL/L (ref 99–110)
CO2: 20 MMOL/L (ref 21–32)
CREAT SERPL-MCNC: 1 MG/DL (ref 0.9–1.3)
DIFFERENTIAL TYPE: ABNORMAL
EOSINOPHILS ABSOLUTE: 0.3 K/CU MM
EOSINOPHILS RELATIVE PERCENT: 4.4 % (ref 0–3)
GFR SERPL CREATININE-BSD FRML MDRD: >60 ML/MIN/1.73M2
GLUCOSE SERPL-MCNC: 84 MG/DL (ref 70–99)
HCT VFR BLD CALC: 40.9 % (ref 42–52)
HEMOGLOBIN: 12.6 GM/DL (ref 13.5–18)
IMMATURE NEUTROPHIL %: 0.1 % (ref 0–0.43)
LYMPHOCYTES ABSOLUTE: 1.9 K/CU MM
LYMPHOCYTES RELATIVE PERCENT: 25.5 % (ref 24–44)
MCH RBC QN AUTO: 30.1 PG (ref 27–31)
MCHC RBC AUTO-ENTMCNC: 30.8 % (ref 32–36)
MCV RBC AUTO: 97.6 FL (ref 78–100)
MONOCYTES ABSOLUTE: 0.7 K/CU MM
MONOCYTES RELATIVE PERCENT: 9 % (ref 0–4)
NUCLEATED RBC %: 0 %
PDW BLD-RTO: 12.6 % (ref 11.7–14.9)
PLATELET # BLD: 166 K/CU MM (ref 140–440)
PMV BLD AUTO: 9.2 FL (ref 7.5–11.1)
POTASSIUM SERPL-SCNC: 4.3 MMOL/L (ref 3.5–5.1)
RBC # BLD: 4.19 M/CU MM (ref 4.6–6.2)
SEGMENTED NEUTROPHILS ABSOLUTE COUNT: 4.4 K/CU MM
SEGMENTED NEUTROPHILS RELATIVE PERCENT: 60.6 % (ref 36–66)
SODIUM BLD-SCNC: 138 MMOL/L (ref 135–145)
TOTAL IMMATURE NEUTOROPHIL: 0.01 K/CU MM
TOTAL NUCLEATED RBC: 0 K/CU MM
WBC # BLD: 7.3 K/CU MM (ref 4–10.5)

## 2023-06-30 PROCEDURE — 6360000002 HC RX W HCPCS: Performed by: INTERNAL MEDICINE

## 2023-06-30 PROCEDURE — 36415 COLL VENOUS BLD VENIPUNCTURE: CPT

## 2023-06-30 PROCEDURE — 6370000000 HC RX 637 (ALT 250 FOR IP): Performed by: INTERNAL MEDICINE

## 2023-06-30 PROCEDURE — 2580000003 HC RX 258: Performed by: PHYSICIAN ASSISTANT

## 2023-06-30 PROCEDURE — 85025 COMPLETE CBC W/AUTO DIFF WBC: CPT

## 2023-06-30 PROCEDURE — 80048 BASIC METABOLIC PNL TOTAL CA: CPT

## 2023-06-30 PROCEDURE — 1200000000 HC SEMI PRIVATE

## 2023-06-30 PROCEDURE — 94761 N-INVAS EAR/PLS OXIMETRY MLT: CPT

## 2023-06-30 PROCEDURE — 2580000003 HC RX 258: Performed by: INTERNAL MEDICINE

## 2023-06-30 PROCEDURE — 99255 IP/OBS CONSLTJ NEW/EST HI 80: CPT | Performed by: INTERNAL MEDICINE

## 2023-06-30 PROCEDURE — 99253 IP/OBS CNSLTJ NEW/EST LOW 45: CPT | Performed by: SURGERY

## 2023-06-30 RX ORDER — METRONIDAZOLE 250 MG/1
500 TABLET ORAL EVERY 8 HOURS SCHEDULED
Status: DISCONTINUED | OUTPATIENT
Start: 2023-06-30 | End: 2023-07-03 | Stop reason: HOSPADM

## 2023-06-30 RX ORDER — HYDROCODONE BITARTRATE AND ACETAMINOPHEN 5; 325 MG/1; MG/1
1 TABLET ORAL EVERY 6 HOURS PRN
Status: DISCONTINUED | OUTPATIENT
Start: 2023-06-30 | End: 2023-07-03 | Stop reason: HOSPADM

## 2023-06-30 RX ADMIN — CEFEPIME 2000 MG: 2 INJECTION, POWDER, FOR SOLUTION INTRAVENOUS at 17:20

## 2023-06-30 RX ADMIN — METRONIDAZOLE 500 MG: 250 TABLET ORAL at 22:06

## 2023-06-30 RX ADMIN — CEFEPIME 2000 MG: 2 INJECTION, POWDER, FOR SOLUTION INTRAVENOUS at 06:44

## 2023-06-30 RX ADMIN — SODIUM CHLORIDE, PRESERVATIVE FREE 10 ML: 5 INJECTION INTRAVENOUS at 09:28

## 2023-06-30 RX ADMIN — HYDROCODONE BITARTRATE AND ACETAMINOPHEN 1 TABLET: 5; 325 TABLET ORAL at 22:07

## 2023-06-30 RX ADMIN — BENZTROPINE MESYLATE 1 MG: 1 TABLET ORAL at 09:28

## 2023-06-30 RX ADMIN — SODIUM CHLORIDE: 9 INJECTION, SOLUTION INTRAVENOUS at 22:20

## 2023-06-30 RX ADMIN — SODIUM CHLORIDE: 9 INJECTION, SOLUTION INTRAVENOUS at 02:13

## 2023-06-30 RX ADMIN — GABAPENTIN 600 MG: 300 CAPSULE ORAL at 09:27

## 2023-06-30 RX ADMIN — RISPERIDONE 1 MG: 0.5 TABLET ORAL at 22:06

## 2023-06-30 RX ADMIN — FAMOTIDINE 20 MG: 20 TABLET ORAL at 22:07

## 2023-06-30 RX ADMIN — VANCOMYCIN HYDROCHLORIDE 1250 MG: 1.25 INJECTION, POWDER, LYOPHILIZED, FOR SOLUTION INTRAVENOUS at 09:34

## 2023-06-30 RX ADMIN — HYDROCODONE BITARTRATE AND ACETAMINOPHEN 1 TABLET: 5; 325 TABLET ORAL at 15:28

## 2023-06-30 RX ADMIN — FAMOTIDINE 20 MG: 20 TABLET ORAL at 09:27

## 2023-06-30 RX ADMIN — GABAPENTIN 600 MG: 300 CAPSULE ORAL at 15:28

## 2023-06-30 RX ADMIN — DICYCLOMINE HYDROCHLORIDE 20 MG: 20 TABLET ORAL at 22:07

## 2023-06-30 RX ADMIN — RISPERIDONE 1 MG: 0.5 TABLET ORAL at 09:27

## 2023-06-30 RX ADMIN — GABAPENTIN 600 MG: 300 CAPSULE ORAL at 22:07

## 2023-06-30 RX ADMIN — HYDROXYZINE PAMOATE 50 MG: 25 CAPSULE ORAL at 22:06

## 2023-06-30 RX ADMIN — OLANZAPINE 5 MG: 5 TABLET, FILM COATED ORAL at 22:14

## 2023-06-30 RX ADMIN — VANCOMYCIN HYDROCHLORIDE 1250 MG: 1.25 INJECTION, POWDER, LYOPHILIZED, FOR SOLUTION INTRAVENOUS at 22:22

## 2023-06-30 ASSESSMENT — PAIN - FUNCTIONAL ASSESSMENT: PAIN_FUNCTIONAL_ASSESSMENT: ACTIVITIES ARE NOT PREVENTED

## 2023-06-30 ASSESSMENT — PAIN SCALES - GENERAL
PAINLEVEL_OUTOF10: 6
PAINLEVEL_OUTOF10: 0

## 2023-06-30 ASSESSMENT — PAIN DESCRIPTION - LOCATION
LOCATION: LEG
LOCATION: LEG;RIB CAGE

## 2023-06-30 ASSESSMENT — PAIN DESCRIPTION - DESCRIPTORS: DESCRIPTORS: ACHING;BURNING;SORE

## 2023-06-30 ASSESSMENT — PAIN DESCRIPTION - ORIENTATION
ORIENTATION: LEFT
ORIENTATION: LEFT

## 2023-07-01 LAB
ALBUMIN SERPL-MCNC: 3.2 GM/DL (ref 3.4–5)
ALP BLD-CCNC: 83 IU/L (ref 40–128)
ALT SERPL-CCNC: 30 U/L (ref 10–40)
ANION GAP SERPL CALCULATED.3IONS-SCNC: 10 MMOL/L (ref 4–16)
AST SERPL-CCNC: 29 IU/L (ref 15–37)
BASOPHILS ABSOLUTE: 0 K/CU MM
BASOPHILS RELATIVE PERCENT: 0.4 % (ref 0–1)
BILIRUB SERPL-MCNC: 0.4 MG/DL (ref 0–1)
BUN SERPL-MCNC: 6 MG/DL (ref 6–23)
CALCIUM IONIZED: 4.84 MG/DL (ref 4.48–5.28)
CALCIUM SERPL-MCNC: 8.3 MG/DL (ref 8.3–10.6)
CHLORIDE BLD-SCNC: 108 MMOL/L (ref 99–110)
CO2: 25 MMOL/L (ref 21–32)
CREAT SERPL-MCNC: 0.9 MG/DL (ref 0.9–1.3)
CRP SERPL HS-MCNC: 47.2 MG/L
DIFFERENTIAL TYPE: ABNORMAL
DOSE AMOUNT: NORMAL
DOSE TIME: NORMAL
EOSINOPHILS ABSOLUTE: 0.3 K/CU MM
EOSINOPHILS RELATIVE PERCENT: 4.8 % (ref 0–3)
GFR SERPL CREATININE-BSD FRML MDRD: >60 ML/MIN/1.73M2
GLUCOSE SERPL-MCNC: 109 MG/DL (ref 70–99)
HCT VFR BLD CALC: 40.1 % (ref 42–52)
HEMOGLOBIN: 13.2 GM/DL (ref 13.5–18)
IMMATURE NEUTROPHIL %: 0.3 % (ref 0–0.43)
IONIZED CA: 1.21 MMOL/L (ref 1.12–1.32)
LYMPHOCYTES ABSOLUTE: 2 K/CU MM
LYMPHOCYTES RELATIVE PERCENT: 29.5 % (ref 24–44)
MCH RBC QN AUTO: 29.7 PG (ref 27–31)
MCHC RBC AUTO-ENTMCNC: 32.9 % (ref 32–36)
MCV RBC AUTO: 90.3 FL (ref 78–100)
MONOCYTES ABSOLUTE: 0.6 K/CU MM
MONOCYTES RELATIVE PERCENT: 8.4 % (ref 0–4)
NUCLEATED RBC %: 0 %
PDW BLD-RTO: 12.5 % (ref 11.7–14.9)
PLATELET # BLD: 235 K/CU MM (ref 140–440)
PMV BLD AUTO: 9.6 FL (ref 7.5–11.1)
POTASSIUM SERPL-SCNC: 3.9 MMOL/L (ref 3.5–5.1)
RBC # BLD: 4.44 M/CU MM (ref 4.6–6.2)
SEGMENTED NEUTROPHILS ABSOLUTE COUNT: 3.9 K/CU MM
SEGMENTED NEUTROPHILS RELATIVE PERCENT: 56.6 % (ref 36–66)
SODIUM BLD-SCNC: 143 MMOL/L (ref 135–145)
TOTAL IMMATURE NEUTOROPHIL: 0.02 K/CU MM
TOTAL NUCLEATED RBC: 0 K/CU MM
TOTAL PROTEIN: 5.5 GM/DL (ref 6.4–8.2)
VANCOMYCIN RANDOM: 18.8 UG/ML
WBC # BLD: 6.9 K/CU MM (ref 4–10.5)

## 2023-07-01 PROCEDURE — 6370000000 HC RX 637 (ALT 250 FOR IP): Performed by: STUDENT IN AN ORGANIZED HEALTH CARE EDUCATION/TRAINING PROGRAM

## 2023-07-01 PROCEDURE — 86140 C-REACTIVE PROTEIN: CPT

## 2023-07-01 PROCEDURE — 6360000002 HC RX W HCPCS: Performed by: INTERNAL MEDICINE

## 2023-07-01 PROCEDURE — 6370000000 HC RX 637 (ALT 250 FOR IP): Performed by: INTERNAL MEDICINE

## 2023-07-01 PROCEDURE — 94761 N-INVAS EAR/PLS OXIMETRY MLT: CPT

## 2023-07-01 PROCEDURE — 1200000000 HC SEMI PRIVATE

## 2023-07-01 PROCEDURE — 82330 ASSAY OF CALCIUM: CPT

## 2023-07-01 PROCEDURE — 36415 COLL VENOUS BLD VENIPUNCTURE: CPT

## 2023-07-01 PROCEDURE — 80202 ASSAY OF VANCOMYCIN: CPT

## 2023-07-01 PROCEDURE — 80053 COMPREHEN METABOLIC PANEL: CPT

## 2023-07-01 PROCEDURE — 2580000003 HC RX 258: Performed by: INTERNAL MEDICINE

## 2023-07-01 PROCEDURE — 85025 COMPLETE CBC W/AUTO DIFF WBC: CPT

## 2023-07-01 PROCEDURE — 94664 DEMO&/EVAL PT USE INHALER: CPT

## 2023-07-01 RX ADMIN — HYDROCODONE BITARTRATE AND ACETAMINOPHEN 1 TABLET: 5; 325 TABLET ORAL at 06:07

## 2023-07-01 RX ADMIN — GABAPENTIN 600 MG: 300 CAPSULE ORAL at 10:09

## 2023-07-01 RX ADMIN — METRONIDAZOLE 500 MG: 250 TABLET ORAL at 06:07

## 2023-07-01 RX ADMIN — ENOXAPARIN SODIUM 30 MG: 100 INJECTION SUBCUTANEOUS at 21:40

## 2023-07-01 RX ADMIN — FAMOTIDINE 20 MG: 20 TABLET ORAL at 21:40

## 2023-07-01 RX ADMIN — RISPERIDONE 1 MG: 0.5 TABLET ORAL at 10:09

## 2023-07-01 RX ADMIN — CEFEPIME 2000 MG: 2 INJECTION, POWDER, FOR SOLUTION INTRAVENOUS at 18:58

## 2023-07-01 RX ADMIN — GABAPENTIN 600 MG: 300 CAPSULE ORAL at 13:47

## 2023-07-01 RX ADMIN — VANCOMYCIN HYDROCHLORIDE 1250 MG: 1.25 INJECTION, POWDER, LYOPHILIZED, FOR SOLUTION INTRAVENOUS at 18:50

## 2023-07-01 RX ADMIN — CEFEPIME 2000 MG: 2 INJECTION, POWDER, FOR SOLUTION INTRAVENOUS at 01:59

## 2023-07-01 RX ADMIN — CEFEPIME 2000 MG: 2 INJECTION, POWDER, FOR SOLUTION INTRAVENOUS at 10:15

## 2023-07-01 RX ADMIN — RISPERIDONE 1 MG: 0.5 TABLET ORAL at 21:40

## 2023-07-01 RX ADMIN — METRONIDAZOLE 500 MG: 250 TABLET ORAL at 13:47

## 2023-07-01 RX ADMIN — METRONIDAZOLE 500 MG: 250 TABLET ORAL at 21:40

## 2023-07-01 RX ADMIN — HYDROCODONE BITARTRATE AND ACETAMINOPHEN 1 TABLET: 5; 325 TABLET ORAL at 13:47

## 2023-07-01 RX ADMIN — BENZTROPINE MESYLATE 1 MG: 1 TABLET ORAL at 10:09

## 2023-07-01 RX ADMIN — ENOXAPARIN SODIUM 30 MG: 100 INJECTION SUBCUTANEOUS at 10:10

## 2023-07-01 RX ADMIN — LITHIUM CARBONATE 450 MG: 300 CAPSULE, GELATIN COATED ORAL at 18:50

## 2023-07-01 RX ADMIN — SODIUM CHLORIDE 25 ML: 9 INJECTION, SOLUTION INTRAVENOUS at 18:57

## 2023-07-01 RX ADMIN — GABAPENTIN 600 MG: 300 CAPSULE ORAL at 21:39

## 2023-07-01 RX ADMIN — FAMOTIDINE 20 MG: 20 TABLET ORAL at 10:09

## 2023-07-01 RX ADMIN — OLANZAPINE 5 MG: 5 TABLET, FILM COATED ORAL at 21:50

## 2023-07-01 RX ADMIN — VANCOMYCIN HYDROCHLORIDE 1250 MG: 1.25 INJECTION, POWDER, LYOPHILIZED, FOR SOLUTION INTRAVENOUS at 10:13

## 2023-07-01 ASSESSMENT — PAIN DESCRIPTION - ORIENTATION
ORIENTATION: LEFT

## 2023-07-01 ASSESSMENT — PAIN DESCRIPTION - DESCRIPTORS
DESCRIPTORS: DISCOMFORT
DESCRIPTORS: BURNING
DESCRIPTORS: ACHING;STABBING;BURNING

## 2023-07-01 ASSESSMENT — PAIN SCALES - GENERAL
PAINLEVEL_OUTOF10: 6
PAINLEVEL_OUTOF10: 7
PAINLEVEL_OUTOF10: 4
PAINLEVEL_OUTOF10: 5

## 2023-07-01 ASSESSMENT — PAIN DESCRIPTION - LOCATION
LOCATION: LEG;RIB CAGE
LOCATION: RIB CAGE;LEG
LOCATION: RIB CAGE

## 2023-07-01 ASSESSMENT — ENCOUNTER SYMPTOMS
ANAL BLEEDING: 0
RECTAL PAIN: 0
CHOKING: 0
COLOR CHANGE: 1
EYE ITCHING: 0
APNEA: 0
CONSTIPATION: 0
SORE THROAT: 0
PHOTOPHOBIA: 0
BACK PAIN: 0
STRIDOR: 0
EYE REDNESS: 0

## 2023-07-01 ASSESSMENT — PAIN - FUNCTIONAL ASSESSMENT
PAIN_FUNCTIONAL_ASSESSMENT: ACTIVITIES ARE NOT PREVENTED
PAIN_FUNCTIONAL_ASSESSMENT: ACTIVITIES ARE NOT PREVENTED

## 2023-07-01 ASSESSMENT — PAIN SCALES - WONG BAKER: WONGBAKER_NUMERICALRESPONSE: 0

## 2023-07-02 LAB
ALBUMIN SERPL-MCNC: 3.3 GM/DL (ref 3.4–5)
ALP BLD-CCNC: 97 IU/L (ref 40–128)
ALT SERPL-CCNC: 37 U/L (ref 10–40)
ANION GAP SERPL CALCULATED.3IONS-SCNC: 10 MMOL/L (ref 4–16)
AST SERPL-CCNC: 36 IU/L (ref 15–37)
BASOPHILS ABSOLUTE: 0 K/CU MM
BASOPHILS RELATIVE PERCENT: 0.4 % (ref 0–1)
BILIRUB SERPL-MCNC: 0.4 MG/DL (ref 0–1)
BUN SERPL-MCNC: 7 MG/DL (ref 6–23)
CALCIUM SERPL-MCNC: 8.7 MG/DL (ref 8.3–10.6)
CHLORIDE BLD-SCNC: 107 MMOL/L (ref 99–110)
CO2: 24 MMOL/L (ref 21–32)
CREAT SERPL-MCNC: 0.9 MG/DL (ref 0.9–1.3)
CRP SERPL HS-MCNC: 33.2 MG/L
DIFFERENTIAL TYPE: ABNORMAL
EOSINOPHILS ABSOLUTE: 0.4 K/CU MM
EOSINOPHILS RELATIVE PERCENT: 5.9 % (ref 0–3)
GFR SERPL CREATININE-BSD FRML MDRD: >60 ML/MIN/1.73M2
GLUCOSE SERPL-MCNC: 99 MG/DL (ref 70–99)
HCT VFR BLD CALC: 39.6 % (ref 42–52)
HEMOGLOBIN: 13 GM/DL (ref 13.5–18)
IMMATURE NEUTROPHIL %: 0.1 % (ref 0–0.43)
LYMPHOCYTES ABSOLUTE: 2.2 K/CU MM
LYMPHOCYTES RELATIVE PERCENT: 31.4 % (ref 24–44)
MCH RBC QN AUTO: 29.8 PG (ref 27–31)
MCHC RBC AUTO-ENTMCNC: 32.8 % (ref 32–36)
MCV RBC AUTO: 90.8 FL (ref 78–100)
MONOCYTES ABSOLUTE: 0.6 K/CU MM
MONOCYTES RELATIVE PERCENT: 8.8 % (ref 0–4)
NUCLEATED RBC %: 0 %
PDW BLD-RTO: 12.4 % (ref 11.7–14.9)
PLATELET # BLD: 231 K/CU MM (ref 140–440)
PMV BLD AUTO: 9.6 FL (ref 7.5–11.1)
POTASSIUM SERPL-SCNC: 4 MMOL/L (ref 3.5–5.1)
RBC # BLD: 4.36 M/CU MM (ref 4.6–6.2)
SEGMENTED NEUTROPHILS ABSOLUTE COUNT: 3.7 K/CU MM
SEGMENTED NEUTROPHILS RELATIVE PERCENT: 53.4 % (ref 36–66)
SODIUM BLD-SCNC: 141 MMOL/L (ref 135–145)
TOTAL IMMATURE NEUTOROPHIL: 0.01 K/CU MM
TOTAL NUCLEATED RBC: 0 K/CU MM
TOTAL PROTEIN: 6 GM/DL (ref 6.4–8.2)
WBC # BLD: 6.9 K/CU MM (ref 4–10.5)

## 2023-07-02 PROCEDURE — 36415 COLL VENOUS BLD VENIPUNCTURE: CPT

## 2023-07-02 PROCEDURE — 85025 COMPLETE CBC W/AUTO DIFF WBC: CPT

## 2023-07-02 PROCEDURE — 86140 C-REACTIVE PROTEIN: CPT

## 2023-07-02 PROCEDURE — 80053 COMPREHEN METABOLIC PANEL: CPT

## 2023-07-02 PROCEDURE — 2580000003 HC RX 258: Performed by: INTERNAL MEDICINE

## 2023-07-02 PROCEDURE — 6360000002 HC RX W HCPCS: Performed by: INTERNAL MEDICINE

## 2023-07-02 PROCEDURE — 6370000000 HC RX 637 (ALT 250 FOR IP): Performed by: INTERNAL MEDICINE

## 2023-07-02 PROCEDURE — 1200000000 HC SEMI PRIVATE

## 2023-07-02 PROCEDURE — 94761 N-INVAS EAR/PLS OXIMETRY MLT: CPT

## 2023-07-02 PROCEDURE — 76937 US GUIDE VASCULAR ACCESS: CPT

## 2023-07-02 PROCEDURE — 6370000000 HC RX 637 (ALT 250 FOR IP): Performed by: STUDENT IN AN ORGANIZED HEALTH CARE EDUCATION/TRAINING PROGRAM

## 2023-07-02 RX ADMIN — FAMOTIDINE 20 MG: 20 TABLET ORAL at 08:31

## 2023-07-02 RX ADMIN — HYDROCODONE BITARTRATE AND ACETAMINOPHEN 1 TABLET: 5; 325 TABLET ORAL at 12:51

## 2023-07-02 RX ADMIN — BENZTROPINE MESYLATE 1 MG: 1 TABLET ORAL at 08:26

## 2023-07-02 RX ADMIN — ENOXAPARIN SODIUM 30 MG: 100 INJECTION SUBCUTANEOUS at 21:32

## 2023-07-02 RX ADMIN — RISPERIDONE 1 MG: 0.5 TABLET ORAL at 08:26

## 2023-07-02 RX ADMIN — ENOXAPARIN SODIUM 30 MG: 100 INJECTION SUBCUTANEOUS at 08:26

## 2023-07-02 RX ADMIN — HYDROXYZINE PAMOATE 50 MG: 25 CAPSULE ORAL at 21:33

## 2023-07-02 RX ADMIN — VANCOMYCIN HYDROCHLORIDE 1250 MG: 1.25 INJECTION, POWDER, LYOPHILIZED, FOR SOLUTION INTRAVENOUS at 14:24

## 2023-07-02 RX ADMIN — METRONIDAZOLE 500 MG: 250 TABLET ORAL at 21:33

## 2023-07-02 RX ADMIN — HYDROCODONE BITARTRATE AND ACETAMINOPHEN 1 TABLET: 5; 325 TABLET ORAL at 05:57

## 2023-07-02 RX ADMIN — OLANZAPINE 5 MG: 5 TABLET, FILM COATED ORAL at 21:36

## 2023-07-02 RX ADMIN — SODIUM CHLORIDE, PRESERVATIVE FREE 10 ML: 5 INJECTION INTRAVENOUS at 21:35

## 2023-07-02 RX ADMIN — CEFEPIME 2000 MG: 2 INJECTION, POWDER, FOR SOLUTION INTRAVENOUS at 08:22

## 2023-07-02 RX ADMIN — SODIUM CHLORIDE, PRESERVATIVE FREE 10 ML: 5 INJECTION INTRAVENOUS at 08:23

## 2023-07-02 RX ADMIN — METRONIDAZOLE 500 MG: 250 TABLET ORAL at 14:15

## 2023-07-02 RX ADMIN — CEFEPIME 2000 MG: 2 INJECTION, POWDER, FOR SOLUTION INTRAVENOUS at 17:05

## 2023-07-02 RX ADMIN — RISPERIDONE 1 MG: 0.5 TABLET ORAL at 21:33

## 2023-07-02 RX ADMIN — HYDROCODONE BITARTRATE AND ACETAMINOPHEN 1 TABLET: 5; 325 TABLET ORAL at 21:33

## 2023-07-02 RX ADMIN — GABAPENTIN 600 MG: 300 CAPSULE ORAL at 21:33

## 2023-07-02 RX ADMIN — GABAPENTIN 600 MG: 300 CAPSULE ORAL at 14:15

## 2023-07-02 RX ADMIN — METRONIDAZOLE 500 MG: 250 TABLET ORAL at 05:57

## 2023-07-02 RX ADMIN — GABAPENTIN 600 MG: 300 CAPSULE ORAL at 08:26

## 2023-07-02 RX ADMIN — FAMOTIDINE 20 MG: 20 TABLET ORAL at 21:33

## 2023-07-02 RX ADMIN — LITHIUM CARBONATE 450 MG: 300 CAPSULE, GELATIN COATED ORAL at 08:26

## 2023-07-02 ASSESSMENT — ENCOUNTER SYMPTOMS
STRIDOR: 0
BACK PAIN: 0
CONSTIPATION: 0
COLOR CHANGE: 1
PHOTOPHOBIA: 0
RECTAL PAIN: 0
APNEA: 0
CHOKING: 0
SORE THROAT: 0
EYE REDNESS: 0
ANAL BLEEDING: 0
EYE ITCHING: 0

## 2023-07-02 ASSESSMENT — PAIN DESCRIPTION - DESCRIPTORS
DESCRIPTORS: ACHING;DISCOMFORT;CRAMPING
DESCRIPTORS: ACHING;SORE
DESCRIPTORS: BURNING;TENDER

## 2023-07-02 ASSESSMENT — PAIN DESCRIPTION - ORIENTATION
ORIENTATION: LEFT

## 2023-07-02 ASSESSMENT — PAIN SCALES - GENERAL
PAINLEVEL_OUTOF10: 7
PAINLEVEL_OUTOF10: 6
PAINLEVEL_OUTOF10: 2
PAINLEVEL_OUTOF10: 3
PAINLEVEL_OUTOF10: 6
PAINLEVEL_OUTOF10: 7

## 2023-07-02 ASSESSMENT — PAIN DESCRIPTION - LOCATION
LOCATION: LEG
LOCATION: FLANK;LEG
LOCATION: LEG

## 2023-07-03 VITALS
DIASTOLIC BLOOD PRESSURE: 89 MMHG | RESPIRATION RATE: 18 BRPM | OXYGEN SATURATION: 95 % | HEIGHT: 76 IN | HEART RATE: 64 BPM | SYSTOLIC BLOOD PRESSURE: 133 MMHG | BODY MASS INDEX: 34.1 KG/M2 | WEIGHT: 280 LBS | TEMPERATURE: 98.3 F

## 2023-07-03 PROCEDURE — 6360000002 HC RX W HCPCS: Performed by: INTERNAL MEDICINE

## 2023-07-03 PROCEDURE — 6370000000 HC RX 637 (ALT 250 FOR IP): Performed by: INTERNAL MEDICINE

## 2023-07-03 PROCEDURE — 2580000003 HC RX 258: Performed by: INTERNAL MEDICINE

## 2023-07-03 PROCEDURE — APPNB15 APP NON BILLABLE TIME 0-15 MINS: Performed by: NURSE PRACTITIONER

## 2023-07-03 PROCEDURE — 85025 COMPLETE CBC W/AUTO DIFF WBC: CPT

## 2023-07-03 RX ADMIN — HYDROCODONE BITARTRATE AND ACETAMINOPHEN 1 TABLET: 5; 325 TABLET ORAL at 06:04

## 2023-07-03 RX ADMIN — METRONIDAZOLE 500 MG: 250 TABLET ORAL at 06:04

## 2023-07-03 RX ADMIN — CEFEPIME 2000 MG: 2 INJECTION, POWDER, FOR SOLUTION INTRAVENOUS at 01:34

## 2023-07-03 RX ADMIN — DICYCLOMINE HYDROCHLORIDE 20 MG: 20 TABLET ORAL at 06:18

## 2023-07-03 RX ADMIN — PROMETHAZINE HYDROCHLORIDE 25 MG: 25 TABLET ORAL at 06:18

## 2023-07-03 RX ADMIN — SODIUM CHLORIDE 25 ML: 9 INJECTION, SOLUTION INTRAVENOUS at 01:33

## 2023-07-03 RX ADMIN — VANCOMYCIN HYDROCHLORIDE 1250 MG: 1.25 INJECTION, POWDER, LYOPHILIZED, FOR SOLUTION INTRAVENOUS at 06:10

## 2023-07-03 RX ADMIN — HYDROXYZINE PAMOATE 50 MG: 25 CAPSULE ORAL at 06:04

## 2023-07-03 ASSESSMENT — PAIN DESCRIPTION - ORIENTATION
ORIENTATION: LEFT
ORIENTATION: LEFT

## 2023-07-03 ASSESSMENT — ENCOUNTER SYMPTOMS
APNEA: 0
CONSTIPATION: 0
RECTAL PAIN: 0
PHOTOPHOBIA: 0
COLOR CHANGE: 1
EYE REDNESS: 0
EYE ITCHING: 0
ANAL BLEEDING: 0
CHOKING: 0
SORE THROAT: 0
STRIDOR: 0
BACK PAIN: 0

## 2023-07-03 ASSESSMENT — PAIN DESCRIPTION - LOCATION
LOCATION: FLANK
LOCATION: RIB CAGE;LEG

## 2023-07-03 ASSESSMENT — PAIN DESCRIPTION - DESCRIPTORS
DESCRIPTORS: ACHING;STABBING
DESCRIPTORS: CRAMPING

## 2023-07-03 ASSESSMENT — PAIN SCALES - GENERAL
PAINLEVEL_OUTOF10: 7
PAINLEVEL_OUTOF10: 6
PAINLEVEL_OUTOF10: 4

## 2023-07-03 NOTE — PROGRESS NOTES
Removed pt IV and hospitalitis notified that pt was leaving AMA and AMA paper signed and with pt soft chart

## 2023-07-03 NOTE — DISCHARGE SUMMARY
V2.0  Discharge Summary ([address] [place] [address])    Name:  Sandra Broderick /Age/Sex: 1975 (52 y.o. male)   Admit Date: 2023  Discharge Date: 7/3/23    MRN & CSN:  [id number] & [id number] Encounter Date and Time 7/3/23 12:49 PM EDT    Attending:  No att. providers found Discharging Provider: Mayo Piña MD       Hospital Course:     Brief HPI: Sandra Broderick is a 52 y.o. male with chronic hepatitis C, bipolar disorder, schizoaffective disorder, depression, anxiety, history of bladder cancer, history of subdural hematoma, history of IV drug use presented to ED with complaints of pain in his left lower extremity. Patient also reported redness and swelling. Patient admitted to injecting drugs into his calf area and missed the vein. Patient denied any fever, chills, cough. Admits to having chest pain which is pleuritic due to rib fracture. Denies any nausea, vomiting, abdominal pain, denied any urinary complaints, denying constipation or diarrhea. Vitals on presentation-/71, HR 95, RR 18, temp 99, saturating 96% on room air. Labs significant for lactic acid 2, random glucose 142, LFTs within normal range, CBC within normal range. Blood cultures drawn in ED. X-ray of the [id number] acute osseous abnormality. CT tibia/fibula-fluid collection adjacent to the medial gastrinomas muscle. General surgery consulted from ER. Patient received vancomycin, cefepime, NS bolus, Toradol. Brief Problem Based Course:     Patient came with redness and swelling in his left calf region, found to have cellulitis. Was getting IV abx and seen by surgery this AM. He was told by surgical team Abscess is starting to coalesce, explained that this will need to be drained. Patient said he has something important and needs to go home. He was explained, The risk of prematurely leaving hospital without the appropriate treatment can lead to bacteremia and septic shock which could even lead to death.  Patient left

## 2023-07-04 LAB
CULTURE: NORMAL
CULTURE: NORMAL
Lab: NORMAL
Lab: NORMAL
SPECIMEN: NORMAL
SPECIMEN: NORMAL

## 2023-10-22 ENCOUNTER — HOSPITAL ENCOUNTER (EMERGENCY)
Age: 48
Discharge: HOME | End: 2023-10-22
Payer: COMMERCIAL

## 2023-10-22 VITALS
RESPIRATION RATE: 16 BRPM | WEIGHT: 270 LBS | SYSTOLIC BLOOD PRESSURE: 122 MMHG | TEMPERATURE: 98.4 F | OXYGEN SATURATION: 98 % | DIASTOLIC BLOOD PRESSURE: 76 MMHG | BODY MASS INDEX: 32.87 KG/M2 | HEART RATE: 96 BPM

## 2023-10-22 DIAGNOSIS — F19.10 IV DRUG ABUSE (HCC): ICD-10-CM

## 2023-10-22 DIAGNOSIS — L02.91 ABSCESS: Primary | ICD-10-CM

## 2023-10-22 PROCEDURE — 87186 SC STD MICRODIL/AGAR DIL: CPT

## 2023-10-22 PROCEDURE — 87075 CULTR BACTERIA EXCEPT BLOOD: CPT

## 2023-10-22 PROCEDURE — 99283 EMERGENCY DEPT VISIT LOW MDM: CPT

## 2023-10-22 PROCEDURE — 87077 CULTURE AEROBIC IDENTIFY: CPT

## 2023-10-22 PROCEDURE — 87076 CULTURE ANAEROBE IDENT EACH: CPT

## 2023-10-22 PROCEDURE — 6370000000 HC RX 637 (ALT 250 FOR IP): Performed by: NURSE PRACTITIONER

## 2023-10-22 PROCEDURE — 87070 CULTURE OTHR SPECIMN AEROBIC: CPT

## 2023-10-22 RX ORDER — OXYCODONE HYDROCHLORIDE AND ACETAMINOPHEN 5; 325 MG/1; MG/1
1 TABLET ORAL ONCE
Status: COMPLETED | OUTPATIENT
Start: 2023-10-22 | End: 2023-10-22

## 2023-10-22 RX ORDER — SULFAMETHOXAZOLE AND TRIMETHOPRIM 800; 160 MG/1; MG/1
1 TABLET ORAL 2 TIMES DAILY
Qty: 20 TABLET | Refills: 0 | Status: SHIPPED | OUTPATIENT
Start: 2023-10-22 | End: 2023-11-01

## 2023-10-22 RX ORDER — CEPHALEXIN 500 MG/1
500 CAPSULE ORAL 4 TIMES DAILY
Qty: 40 CAPSULE | Refills: 0 | Status: SHIPPED | OUTPATIENT
Start: 2023-10-22 | End: 2023-11-01

## 2023-10-22 RX ADMIN — OXYCODONE AND ACETAMINOPHEN 1 TABLET: 5; 325 TABLET ORAL at 14:16

## 2023-10-22 ASSESSMENT — PAIN DESCRIPTION - LOCATION: LOCATION: LEG

## 2023-10-22 ASSESSMENT — PAIN SCALES - GENERAL: PAINLEVEL_OUTOF10: 4

## 2023-10-22 ASSESSMENT — PAIN DESCRIPTION - ORIENTATION: ORIENTATION: RIGHT;LOWER

## 2023-10-22 ASSESSMENT — PAIN - FUNCTIONAL ASSESSMENT: PAIN_FUNCTIONAL_ASSESSMENT: 0-10

## 2023-10-22 ASSESSMENT — PAIN DESCRIPTION - DESCRIPTORS: DESCRIPTORS: ACHING

## 2023-10-23 NOTE — ED PROVIDER NOTES
recognition program.  Efforts were made to edit the dictations but occasionally words are mis-transcribed.)    ANA M Agarwal CNP (electronically signed)              ANA M Agarwal CNP  10/22/23 5756

## 2023-10-24 NOTE — PROGRESS NOTES
Pharmacy Note  ED Culture Follow-up    Mane Section is a 50 y.o. male. Allergies: Codeine     Current antimicrobials:   Reviewed patient's wound culture - culture positive for staph aureus MRSA moderate growth. Patient was discharged on Bactrim -160 mg by mouth twice daily for 10 days, and culture is sensitive to prescribed medication. Antibiotic prescribed at discharge is appropriate - no changes made to antibiotic regimen. Please call with any questions.  Galina Batista, [address] [place] [address], PharmD 9:15 AM 10/24/2023

## 2023-10-27 LAB
CULTURE: ABNORMAL
Lab: ABNORMAL
SPECIMEN: ABNORMAL

## 2023-12-06 ENCOUNTER — OFFICE VISIT (OUTPATIENT)
Dept: GASTROENTEROLOGY | Age: 48
End: 2023-12-06
Payer: COMMERCIAL

## 2023-12-06 VITALS
SYSTOLIC BLOOD PRESSURE: 134 MMHG | BODY MASS INDEX: 36.31 KG/M2 | DIASTOLIC BLOOD PRESSURE: 80 MMHG | HEIGHT: 76 IN | OXYGEN SATURATION: 98 % | HEART RATE: 105 BPM | TEMPERATURE: 98 F | WEIGHT: 298.2 LBS

## 2023-12-06 DIAGNOSIS — K21.9 GASTROESOPHAGEAL REFLUX DISEASE, UNSPECIFIED WHETHER ESOPHAGITIS PRESENT: Primary | ICD-10-CM

## 2023-12-06 DIAGNOSIS — R11.0 NAUSEA: ICD-10-CM

## 2023-12-06 DIAGNOSIS — Z12.11 ENCOUNTER FOR SCREENING COLONOSCOPY: ICD-10-CM

## 2023-12-06 PROCEDURE — 4004F PT TOBACCO SCREEN RCVD TLK: CPT | Performed by: NURSE PRACTITIONER

## 2023-12-06 PROCEDURE — 99204 OFFICE O/P NEW MOD 45 MIN: CPT | Performed by: NURSE PRACTITIONER

## 2023-12-06 PROCEDURE — G8417 CALC BMI ABV UP PARAM F/U: HCPCS | Performed by: NURSE PRACTITIONER

## 2023-12-06 PROCEDURE — G8427 DOCREV CUR MEDS BY ELIG CLIN: HCPCS | Performed by: NURSE PRACTITIONER

## 2023-12-06 PROCEDURE — G8484 FLU IMMUNIZE NO ADMIN: HCPCS | Performed by: NURSE PRACTITIONER

## 2023-12-06 RX ORDER — POLYETHYLENE GLYCOL 3350, SODIUM SULFATE ANHYDROUS, SODIUM BICARBONATE, SODIUM CHLORIDE, POTASSIUM CHLORIDE 236; 22.74; 6.74; 5.86; 2.97 G/4L; G/4L; G/4L; G/4L; G/4L
4 POWDER, FOR SOLUTION ORAL ONCE
Qty: 4000 ML | Refills: 0 | Status: SHIPPED | OUTPATIENT
Start: 2023-12-06 | End: 2023-12-06

## 2023-12-06 RX ORDER — BUPRENORPHINE AND NALOXONE 8; 2 MG/1; MG/1
FILM, SOLUBLE BUCCAL; SUBLINGUAL
COMMUNITY

## 2023-12-06 RX ORDER — OXCARBAZEPINE 300 MG/1
TABLET, FILM COATED ORAL
COMMUNITY
Start: 2023-11-27

## 2023-12-06 RX ORDER — CLONAZEPAM 0.5 MG/1
TABLET ORAL
COMMUNITY
Start: 2023-11-13

## 2023-12-06 RX ORDER — OMEPRAZOLE 40 MG/1
40 CAPSULE, DELAYED RELEASE ORAL
Qty: 90 CAPSULE | Refills: 3 | Status: SHIPPED | OUTPATIENT
Start: 2023-12-06

## 2023-12-06 RX ORDER — PROPRANOLOL HYDROCHLORIDE 10 MG/1
10 TABLET ORAL DAILY PRN
COMMUNITY
Start: 2023-11-14

## 2023-12-06 RX ORDER — POLYETHYLENE GLYCOL 3350 17 G/17G
17 POWDER, FOR SOLUTION ORAL 2 TIMES DAILY
Qty: 527 G | Refills: 3 | Status: SHIPPED | OUTPATIENT
Start: 2023-12-06 | End: 2024-01-05

## 2023-12-06 RX ORDER — DOCUSATE SODIUM 100 MG/1
100 CAPSULE, LIQUID FILLED ORAL 2 TIMES DAILY
Qty: 60 CAPSULE | Refills: 3 | Status: SHIPPED | OUTPATIENT
Start: 2023-12-06 | End: 2024-01-05

## 2023-12-06 RX ORDER — DEUTETRABENAZINE 6 MG/1
TABLET, COATED ORAL
COMMUNITY
Start: 2023-11-15

## 2023-12-06 ASSESSMENT — ENCOUNTER SYMPTOMS
WHEEZING: 0
CONSTIPATION: 1
COUGH: 0
BACK PAIN: 1
EYE PAIN: 0
DIARRHEA: 0
SHORTNESS OF BREATH: 0
COLOR CHANGE: 0
NAUSEA: 1
BLOOD IN STOOL: 0
VOMITING: 0
EYE DISCHARGE: 0
ABDOMINAL PAIN: 0

## 2023-12-06 NOTE — PROGRESS NOTES
Joey Ornelas 50 y.o. male was seen by Valentino Pedlar, NP-C on 12/6/2023     Wt Readings from Last 3 Encounters:   12/06/23 135.3 kg (298 lb 3.2 oz)   10/22/23 122.5 kg (270 lb)   06/29/23 127 kg (280 lb)       HPI  Joey Ornelas is a pleasant 50 y.o.  male who presents today for acid reflux, nausea and to schedule a screening colonoscopy. He has a past medical history of anxiety, back pain, bipolar 1 disorder, bipolar affect, depressed,bipolar disorder, bladder cancer, cancer, depression, Hepatitis C, herniated disc, osteoarthritis, psychosis, schizoaffective disorder, schizophrenia, and scoliosis. He has been on Suboxone for a couple years. He smokes pack of cigarettes per day. He denies NSAID or alcohol use. He has never had an EGD or colonoscopy. He denies changes in his bowel pattern. He has constipation from Suboxone. His typical bowel pattern is every three to four days with small hard brown stools. Stool softeners help. No diarrhea. No blood in his stools or melena. No excess belching or flatulence. His appetite is poor without early satiety. His weight is up fifty pounds. In the last two days he has been having nausea. No vomiting. His GERD has been ongoing for twenty years. Pepcid no help. Nocturnal awakenings with acid reflux at least two times a week. No dysphagia or pain with swallowing. No abdominal pain, bloating or distention. No family history of stomach or colon cancer. ROS  Review of Systems   Constitutional:  Negative for appetite change, chills, diaphoresis, fever and unexpected weight change. HENT:  Negative for ear pain and hearing loss. Eyes:  Positive for visual disturbance. Negative for pain and discharge. Respiratory:  Negative for cough, shortness of breath and wheezing. Cardiovascular:  Negative for chest pain, palpitations and leg swelling. Gastrointestinal:  Positive for constipation and nausea.  Negative for abdominal pain, blood

## 2023-12-08 ENCOUNTER — PREP FOR PROCEDURE (OUTPATIENT)
Dept: GASTROENTEROLOGY | Age: 48
End: 2023-12-08

## 2023-12-08 RX ORDER — SODIUM CHLORIDE 0.9 % (FLUSH) 0.9 %
5-40 SYRINGE (ML) INJECTION EVERY 12 HOURS SCHEDULED
Status: CANCELLED | OUTPATIENT
Start: 2023-12-08

## 2023-12-08 RX ORDER — SODIUM CHLORIDE 0.9 % (FLUSH) 0.9 %
5-40 SYRINGE (ML) INJECTION PRN
Status: CANCELLED | OUTPATIENT
Start: 2023-12-08

## 2023-12-08 RX ORDER — SODIUM CHLORIDE 9 MG/ML
25 INJECTION, SOLUTION INTRAVENOUS PRN
Status: CANCELLED | OUTPATIENT
Start: 2023-12-08

## 2023-12-08 RX ORDER — SODIUM CHLORIDE, SODIUM LACTATE, POTASSIUM CHLORIDE, CALCIUM CHLORIDE 600; 310; 30; 20 MG/100ML; MG/100ML; MG/100ML; MG/100ML
INJECTION, SOLUTION INTRAVENOUS CONTINUOUS
Status: CANCELLED | OUTPATIENT
Start: 2023-12-08

## 2023-12-17 PROBLEM — L02.419 CUTANEOUS ABSCESS OF EXTREMITY: Status: ACTIVE | Noted: 2023-06-29

## 2023-12-17 PROBLEM — F19.90 IV DRUG USER: Status: ACTIVE | Noted: 2023-12-17

## 2023-12-17 PROBLEM — L03.90 CELLULITIS: Status: ACTIVE | Noted: 2023-12-17

## 2023-12-21 PROBLEM — L98.492 SKIN ULCER OF HAND WITH FAT LAYER EXPOSED (HCC): Status: ACTIVE | Noted: 2023-12-21

## 2023-12-21 PROBLEM — L97.122 SKIN ULCER OF LEFT THIGH WITH FAT LAYER EXPOSED (HCC): Status: ACTIVE | Noted: 2023-12-21

## 2023-12-21 PROBLEM — L98.492 SKIN ULCER OF FOREARM WITH FAT LAYER EXPOSED (HCC): Status: ACTIVE | Noted: 2023-12-21

## 2023-12-28 ENCOUNTER — HOSPITAL ENCOUNTER (OUTPATIENT)
Dept: WOUND CARE | Age: 48
Discharge: HOME | End: 2023-12-28
Payer: COMMERCIAL

## 2023-12-28 VITALS
DIASTOLIC BLOOD PRESSURE: 96 MMHG | HEART RATE: 86 BPM | RESPIRATION RATE: 20 BRPM | SYSTOLIC BLOOD PRESSURE: 177 MMHG | TEMPERATURE: 97.6 F

## 2023-12-28 DIAGNOSIS — L97.122 SKIN ULCER OF LEFT THIGH WITH FAT LAYER EXPOSED (HCC): Primary | ICD-10-CM

## 2023-12-28 DIAGNOSIS — L98.492 SKIN ULCER OF FOREARM WITH FAT LAYER EXPOSED (HCC): ICD-10-CM

## 2023-12-28 DIAGNOSIS — F19.90 IV DRUG USER: ICD-10-CM

## 2023-12-28 DIAGNOSIS — F17.210 HEAVY SMOKER (MORE THAN 20 CIGARETTES PER DAY): ICD-10-CM

## 2023-12-28 DIAGNOSIS — L98.492 SKIN ULCER OF HAND WITH FAT LAYER EXPOSED (HCC): ICD-10-CM

## 2023-12-28 PROCEDURE — 11042 DBRDMT SUBQ TIS 1ST 20SQCM/<: CPT

## 2023-12-28 PROCEDURE — 6370000000 HC RX 637 (ALT 250 FOR IP): Performed by: NURSE PRACTITIONER

## 2023-12-28 RX ORDER — IBUPROFEN 200 MG
TABLET ORAL ONCE
OUTPATIENT
Start: 2023-12-28 | End: 2023-12-28

## 2023-12-28 RX ORDER — CLOBETASOL PROPIONATE 0.5 MG/G
OINTMENT TOPICAL ONCE
OUTPATIENT
Start: 2023-12-28 | End: 2023-12-28

## 2023-12-28 RX ORDER — TRIAMCINOLONE ACETONIDE 1 MG/G
OINTMENT TOPICAL ONCE
OUTPATIENT
Start: 2023-12-28 | End: 2023-12-28

## 2023-12-28 RX ORDER — BACITRACIN ZINC AND POLYMYXIN B SULFATE 500; 1000 [USP'U]/G; [USP'U]/G
OINTMENT TOPICAL ONCE
OUTPATIENT
Start: 2023-12-28 | End: 2023-12-28

## 2023-12-28 RX ORDER — GENTAMICIN SULFATE 1 MG/G
OINTMENT TOPICAL ONCE
Status: COMPLETED | OUTPATIENT
Start: 2023-12-28 | End: 2023-12-28

## 2023-12-28 RX ORDER — BACITRACIN ZINC 500 [USP'U]/G
OINTMENT TOPICAL ONCE
OUTPATIENT
Start: 2023-12-28 | End: 2023-12-28

## 2023-12-28 RX ORDER — LIDOCAINE 50 MG/G
OINTMENT TOPICAL ONCE
OUTPATIENT
Start: 2023-12-28 | End: 2023-12-28

## 2023-12-28 RX ORDER — BETAMETHASONE DIPROPIONATE 0.5 MG/G
CREAM TOPICAL ONCE
OUTPATIENT
Start: 2023-12-28 | End: 2023-12-28

## 2023-12-28 RX ORDER — LIDOCAINE HYDROCHLORIDE 40 MG/ML
SOLUTION TOPICAL ONCE
OUTPATIENT
Start: 2023-12-28 | End: 2023-12-28

## 2023-12-28 RX ORDER — SODIUM CHLOR/HYPOCHLOROUS ACID 0.033 %
SOLUTION, IRRIGATION IRRIGATION ONCE
OUTPATIENT
Start: 2023-12-28 | End: 2023-12-28

## 2023-12-28 RX ORDER — GENTAMICIN SULFATE 1 MG/G
OINTMENT TOPICAL ONCE
OUTPATIENT
Start: 2023-12-28 | End: 2023-12-28

## 2023-12-28 RX ORDER — LIDOCAINE 40 MG/G
CREAM TOPICAL ONCE
OUTPATIENT
Start: 2023-12-28 | End: 2023-12-28

## 2023-12-28 RX ADMIN — GENTAMICIN SULFATE: 1 OINTMENT TOPICAL at 15:51

## 2023-12-28 NOTE — PATIENT INSTRUCTIONS
PHYSICIAN ORDERS AND DISCHARGE INSTRUCTIONS    Wound cleansing:     Do not scrub or use excessive force. Wash hands with soap and water before and after dressing changes. Prior to applying a clean dressing, cleanse wound with normal saline,               wound cleanser, or mild soap and water. Ask the physician or nurse before getting the wound(s) wet in a shower      Wound Care Notes:  Imaging:   Cultures:             NEGRO:  Wound Care Supplies:   C:   Rx:          Orders for this week:  2023    [x]  Paint toes with betadine       Right Arm /Left Posterior Lower Leg :Wash with soap and water. Pat dry. Pack with liquid silver nitrate damp mesalt  Cover with sorbex and Tegaderm, secured with mastisol. Leave in place until next visit to wound care center      Left Dorsal Hand: Wash with soap and water. Pat dry. Apply gentamicin and stimulewn powder to wound bed. Cover with gentac and tegaderm secured by mastsiol. Leave in place until next visit to wound care center      Dispense 30 day quantity when ordering supplies. []   Nurse Visit:  Follow Up Instructions: At the [address] [place] [address] in 1 week:    Primary Wound Care Provider: Waleska Hurd CNP   Call  for any questions or concerns.   Central [phone] for imaging and lab work

## 2024-01-04 ENCOUNTER — HOSPITAL ENCOUNTER (OUTPATIENT)
Dept: WOUND CARE | Age: 49
Discharge: HOME | End: 2024-01-04

## 2024-01-04 NOTE — PATIENT INSTRUCTIONS
PHYSICIAN ORDERS AND DISCHARGE INSTRUCTIONS    Wound cleansing:     Do not scrub or use excessive force.   Wash hands with soap and water before and after dressing changes.   Prior to applying a clean dressing, cleanse wound with normal saline,               wound cleanser, or mild soap and water.    Ask the physician or nurse before getting the wound(s) wet in a shower      Wound Care Notes:  Imaging:   Cultures:             NEGRO:  Wound Care Supplies:   C:   Rx:          Orders for this week:  2024    []  Paint toes with betadine       Right Arm /Left Posterior Lower Leg :Wash with soap and water. Pat dry.   Pack with liquid silver nitrate damp mesalt  Cover with sorbex and Tegaderm, secured with mastisol.   Leave in place until next visit to wound care center      Left Dorsal Hand: Wash with soap and water. Pat dry.   Apply gentamicin and stimulewn powder to wound bed.   Cover with gentac and tegaderm secured by mastsiol.   Leave in place until next visit to wound care center      Dispense 30 day quantity when ordering supplies.      []   Nurse Visit:  Follow Up Instructions: At the Wound Care Center in 1 week:    Primary Wound Care Provider: Tanya Estevez CNP   Call  for any questions or concerns.  Central [phone] for imaging and lab work

## 2024-01-30 ENCOUNTER — TELEPHONE (OUTPATIENT)
Dept: WOUND CARE | Age: 49
End: 2024-01-30

## 2024-01-30 NOTE — TELEPHONE ENCOUNTER
Called patient for a follow up appointment phone number on file not accepting calls at this time will try back at a later day and time

## 2024-02-05 ENCOUNTER — HOSPITAL ENCOUNTER (INPATIENT)
Age: 49
LOS: 2 days | Discharge: LEFT BEFORE DISCHARGE | DRG: 894 | End: 2024-02-07
Attending: STUDENT IN AN ORGANIZED HEALTH CARE EDUCATION/TRAINING PROGRAM | Admitting: STUDENT IN AN ORGANIZED HEALTH CARE EDUCATION/TRAINING PROGRAM
Payer: COMMERCIAL

## 2024-02-05 ENCOUNTER — APPOINTMENT (OUTPATIENT)
Dept: CT IMAGING | Age: 49
DRG: 894 | End: 2024-02-05
Payer: COMMERCIAL

## 2024-02-05 ENCOUNTER — APPOINTMENT (OUTPATIENT)
Dept: GENERAL RADIOLOGY | Age: 49
DRG: 894 | End: 2024-02-05
Payer: COMMERCIAL

## 2024-02-05 DIAGNOSIS — F19.10 POLYSUBSTANCE ABUSE (HCC): ICD-10-CM

## 2024-02-05 DIAGNOSIS — L98.492 SKIN ULCER OF FOREARM WITH FAT LAYER EXPOSED (HCC): ICD-10-CM

## 2024-02-05 DIAGNOSIS — L03.113 CELLULITIS OF RIGHT FOREARM: Primary | ICD-10-CM

## 2024-02-05 PROBLEM — L03.114 LEFT ARM CELLULITIS: Status: ACTIVE | Noted: 2024-02-05

## 2024-02-05 LAB
ALBUMIN SERPL-MCNC: 4 GM/DL (ref 3.4–5)
ALP BLD-CCNC: 95 IU/L (ref 40–129)
ALT SERPL-CCNC: 42 U/L (ref 10–40)
ANION GAP SERPL CALCULATED.3IONS-SCNC: 10 MMOL/L (ref 7–16)
AST SERPL-CCNC: 30 IU/L (ref 15–37)
BASOPHILS ABSOLUTE: 0 K/CU MM
BASOPHILS RELATIVE PERCENT: 0.3 % (ref 0–1)
BILIRUB SERPL-MCNC: 0.5 MG/DL (ref 0–1)
BUN SERPL-MCNC: 7 MG/DL (ref 6–23)
CALCIUM SERPL-MCNC: 8.9 MG/DL (ref 8.3–10.6)
CHLORIDE BLD-SCNC: 100 MMOL/L (ref 99–110)
CO2: 26 MMOL/L (ref 21–32)
CREAT SERPL-MCNC: 0.9 MG/DL (ref 0.9–1.3)
DIFFERENTIAL TYPE: ABNORMAL
EOSINOPHILS ABSOLUTE: 0.2 K/CU MM
EOSINOPHILS RELATIVE PERCENT: 2.4 % (ref 0–3)
GFR SERPL CREATININE-BSD FRML MDRD: >60 ML/MIN/1.73M2
GLUCOSE SERPL-MCNC: 155 MG/DL (ref 70–99)
HCT VFR BLD CALC: 44.8 % (ref 42–52)
HEMOGLOBIN: 14.7 GM/DL (ref 13.5–18)
IMMATURE NEUTROPHIL %: 0.2 % (ref 0–0.43)
LACTIC ACID, SEPSIS: 0.9 MMOL/L (ref 0.4–2)
LACTIC ACID, SEPSIS: 1.6 MMOL/L (ref 0.4–2)
LYMPHOCYTES ABSOLUTE: 2.3 K/CU MM
LYMPHOCYTES RELATIVE PERCENT: 23.4 % (ref 24–44)
MCH RBC QN AUTO: 29.5 PG (ref 27–31)
MCHC RBC AUTO-ENTMCNC: 32.8 % (ref 32–36)
MCV RBC AUTO: 90 FL (ref 78–100)
MONOCYTES ABSOLUTE: 0.7 K/CU MM
MONOCYTES RELATIVE PERCENT: 6.8 % (ref 0–4)
NUCLEATED RBC %: 0 %
PDW BLD-RTO: 13.2 % (ref 11.7–14.9)
PLATELET # BLD: 264 K/CU MM (ref 140–440)
PMV BLD AUTO: 10.1 FL (ref 7.5–11.1)
POTASSIUM SERPL-SCNC: 4.3 MMOL/L (ref 3.5–5.1)
RBC # BLD: 4.98 M/CU MM (ref 4.6–6.2)
SEGMENTED NEUTROPHILS ABSOLUTE COUNT: 6.7 K/CU MM
SEGMENTED NEUTROPHILS RELATIVE PERCENT: 66.9 % (ref 36–66)
SODIUM BLD-SCNC: 136 MMOL/L (ref 135–145)
TOTAL CK: 127 IU/L (ref 38–174)
TOTAL IMMATURE NEUTOROPHIL: 0.02 K/CU MM
TOTAL NUCLEATED RBC: 0 K/CU MM
TOTAL PROTEIN: 7.5 GM/DL (ref 6.4–8.2)
WBC # BLD: 10 K/CU MM (ref 4–10.5)

## 2024-02-05 PROCEDURE — 99285 EMERGENCY DEPT VISIT HI MDM: CPT

## 2024-02-05 PROCEDURE — 87075 CULTR BACTERIA EXCEPT BLOOD: CPT

## 2024-02-05 PROCEDURE — 87147 CULTURE TYPE IMMUNOLOGIC: CPT

## 2024-02-05 PROCEDURE — 96367 TX/PROPH/DG ADDL SEQ IV INF: CPT

## 2024-02-05 PROCEDURE — 73090 X-RAY EXAM OF FOREARM: CPT

## 2024-02-05 PROCEDURE — 83605 ASSAY OF LACTIC ACID: CPT

## 2024-02-05 PROCEDURE — 73201 CT UPPER EXTREMITY W/DYE: CPT

## 2024-02-05 PROCEDURE — 87186 SC STD MICRODIL/AGAR DIL: CPT

## 2024-02-05 PROCEDURE — 87040 BLOOD CULTURE FOR BACTERIA: CPT

## 2024-02-05 PROCEDURE — 82550 ASSAY OF CK (CPK): CPT

## 2024-02-05 PROCEDURE — C9113 INJ PANTOPRAZOLE SODIUM, VIA: HCPCS | Performed by: NURSE PRACTITIONER

## 2024-02-05 PROCEDURE — 87077 CULTURE AEROBIC IDENTIFY: CPT

## 2024-02-05 PROCEDURE — 6360000002 HC RX W HCPCS: Performed by: NURSE PRACTITIONER

## 2024-02-05 PROCEDURE — 80053 COMPREHEN METABOLIC PANEL: CPT

## 2024-02-05 PROCEDURE — 85025 COMPLETE CBC W/AUTO DIFF WBC: CPT

## 2024-02-05 PROCEDURE — 96366 THER/PROPH/DIAG IV INF ADDON: CPT

## 2024-02-05 PROCEDURE — 2580000003 HC RX 258: Performed by: NURSE PRACTITIONER

## 2024-02-05 PROCEDURE — 96365 THER/PROPH/DIAG IV INF INIT: CPT

## 2024-02-05 PROCEDURE — 6360000004 HC RX CONTRAST MEDICATION: Performed by: PHYSICIAN ASSISTANT

## 2024-02-05 PROCEDURE — 1200000000 HC SEMI PRIVATE

## 2024-02-05 PROCEDURE — 87070 CULTURE OTHR SPECIMN AEROBIC: CPT

## 2024-02-05 PROCEDURE — 96375 TX/PRO/DX INJ NEW DRUG ADDON: CPT

## 2024-02-05 RX ORDER — METHOCARBAMOL 500 MG/1
750 TABLET, FILM COATED ORAL EVERY 6 HOURS PRN
Status: DISCONTINUED | OUTPATIENT
Start: 2024-02-05 | End: 2024-02-07 | Stop reason: HOSPADM

## 2024-02-05 RX ORDER — LANOLIN ALCOHOL/MO/W.PET/CERES
3 CREAM (GRAM) TOPICAL NIGHTLY
Status: DISCONTINUED | OUTPATIENT
Start: 2024-02-05 | End: 2024-02-07 | Stop reason: HOSPADM

## 2024-02-05 RX ORDER — LORAZEPAM 2 MG/ML
0.5 INJECTION INTRAMUSCULAR EVERY 6 HOURS PRN
Status: COMPLETED | OUTPATIENT
Start: 2024-02-05 | End: 2024-02-06

## 2024-02-05 RX ORDER — PANTOPRAZOLE SODIUM 40 MG/10ML
40 INJECTION, POWDER, LYOPHILIZED, FOR SOLUTION INTRAVENOUS ONCE
Status: COMPLETED | OUTPATIENT
Start: 2024-02-05 | End: 2024-02-05

## 2024-02-05 RX ORDER — KETOROLAC TROMETHAMINE 15 MG/ML
30 INJECTION, SOLUTION INTRAMUSCULAR; INTRAVENOUS ONCE
Status: COMPLETED | OUTPATIENT
Start: 2024-02-05 | End: 2024-02-05

## 2024-02-05 RX ADMIN — VANCOMYCIN HYDROCHLORIDE 2000 MG: 5 INJECTION, POWDER, LYOPHILIZED, FOR SOLUTION INTRAVENOUS at 20:49

## 2024-02-05 RX ADMIN — CEFTRIAXONE 1000 MG: 1 INJECTION, POWDER, FOR SOLUTION INTRAMUSCULAR; INTRAVENOUS at 19:06

## 2024-02-05 RX ADMIN — PANTOPRAZOLE SODIUM 40 MG: 40 INJECTION, POWDER, FOR SOLUTION INTRAVENOUS at 19:07

## 2024-02-05 RX ADMIN — KETOROLAC TROMETHAMINE 30 MG: 15 INJECTION, SOLUTION INTRAMUSCULAR; INTRAVENOUS at 19:07

## 2024-02-05 RX ADMIN — IOPAMIDOL 75 ML: 755 INJECTION, SOLUTION INTRAVENOUS at 20:04

## 2024-02-05 ASSESSMENT — PAIN SCALES - GENERAL
PAINLEVEL_OUTOF10: 7
PAINLEVEL_OUTOF10: 7

## 2024-02-05 ASSESSMENT — PAIN DESCRIPTION - ORIENTATION: ORIENTATION: RIGHT

## 2024-02-05 ASSESSMENT — PAIN DESCRIPTION - LOCATION
LOCATION: ARM
LOCATION: ARM;HAND

## 2024-02-05 ASSESSMENT — PAIN DESCRIPTION - DESCRIPTORS
DESCRIPTORS: BURNING
DESCRIPTORS: ACHING

## 2024-02-05 ASSESSMENT — PAIN - FUNCTIONAL ASSESSMENT: PAIN_FUNCTIONAL_ASSESSMENT: 0-10

## 2024-02-06 LAB
ALBUMIN SERPL-MCNC: 3.3 GM/DL (ref 3.4–5)
ALP BLD-CCNC: 75 IU/L (ref 40–128)
ALT SERPL-CCNC: 41 U/L (ref 10–40)
AMPHETAMINES: NEGATIVE
ANION GAP SERPL CALCULATED.3IONS-SCNC: 7 MMOL/L (ref 7–16)
AST SERPL-CCNC: 40 IU/L (ref 15–37)
BARBITURATE SCREEN URINE: NEGATIVE
BASOPHILS ABSOLUTE: 0 K/CU MM
BASOPHILS RELATIVE PERCENT: 0.3 % (ref 0–1)
BENZODIAZEPINE SCREEN, URINE: ABNORMAL
BILIRUB SERPL-MCNC: 0.4 MG/DL (ref 0–1)
BILIRUBIN URINE: NEGATIVE MG/DL
BLOOD, URINE: NEGATIVE
BUN SERPL-MCNC: 8 MG/DL (ref 6–23)
CALCIUM SERPL-MCNC: 8.5 MG/DL (ref 8.3–10.6)
CANNABINOID SCREEN URINE: ABNORMAL
CHLORIDE BLD-SCNC: 106 MMOL/L (ref 99–110)
CLARITY: CLEAR
CO2: 25 MMOL/L (ref 21–32)
COCAINE METABOLITE: ABNORMAL
COLOR: YELLOW
COMMENT UA: NORMAL
CREAT SERPL-MCNC: 0.8 MG/DL (ref 0.9–1.3)
CRP SERPL HS-MCNC: 57.5 MG/L
DIFFERENTIAL TYPE: ABNORMAL
EOSINOPHILS ABSOLUTE: 0.3 K/CU MM
EOSINOPHILS RELATIVE PERCENT: 3.7 % (ref 0–3)
ERYTHROCYTE SEDIMENTATION RATE: 27 MM/HR (ref 0–15)
ESTIMATED AVERAGE GLUCOSE: 120 MG/DL
FENTANYL URINE: ABNORMAL
GFR SERPL CREATININE-BSD FRML MDRD: >60 ML/MIN/1.73M2
GLUCOSE SERPL-MCNC: 128 MG/DL (ref 70–99)
GLUCOSE, URINE: NEGATIVE MG/DL
HBA1C MFR BLD: 5.8 % (ref 4.2–6.3)
HCT VFR BLD CALC: 40.6 % (ref 42–52)
HEMOGLOBIN: 13.3 GM/DL (ref 13.5–18)
IMMATURE NEUTROPHIL %: 0.3 % (ref 0–0.43)
INR BLD: 1 INDEX
KETONES, URINE: NEGATIVE MG/DL
LEUKOCYTE ESTERASE, URINE: NEGATIVE
LYMPHOCYTES ABSOLUTE: 2.1 K/CU MM
LYMPHOCYTES RELATIVE PERCENT: 29 % (ref 24–44)
MCH RBC QN AUTO: 29.6 PG (ref 27–31)
MCHC RBC AUTO-ENTMCNC: 32.8 % (ref 32–36)
MCV RBC AUTO: 90.4 FL (ref 78–100)
MONOCYTES ABSOLUTE: 0.7 K/CU MM
MONOCYTES RELATIVE PERCENT: 9.5 % (ref 0–4)
NITRITE URINE, QUANTITATIVE: NEGATIVE
NUCLEATED RBC %: 0 %
OPIATES, URINE: NEGATIVE
OXYCODONE: NEGATIVE
PDW BLD-RTO: 13.4 % (ref 11.7–14.9)
PH, URINE: 6 (ref 5–8)
PLATELET # BLD: 235 K/CU MM (ref 140–440)
PMV BLD AUTO: 10 FL (ref 7.5–11.1)
POTASSIUM SERPL-SCNC: 3.7 MMOL/L (ref 3.5–5.1)
PROTEIN UA: NEGATIVE MG/DL
PROTHROMBIN TIME: 13.8 SECONDS (ref 11.7–14.5)
RBC # BLD: 4.49 M/CU MM (ref 4.6–6.2)
SEGMENTED NEUTROPHILS ABSOLUTE COUNT: 4.1 K/CU MM
SEGMENTED NEUTROPHILS RELATIVE PERCENT: 57.2 % (ref 36–66)
SODIUM BLD-SCNC: 138 MMOL/L (ref 135–145)
SPECIFIC GRAVITY UA: 1.02 (ref 1–1.03)
TOTAL IMMATURE NEUTOROPHIL: 0.02 K/CU MM
TOTAL NUCLEATED RBC: 0 K/CU MM
TOTAL PROTEIN: 6.3 GM/DL (ref 6.4–8.2)
UROBILINOGEN, URINE: 0.2 MG/DL (ref 0.2–1)
WBC # BLD: 7.2 K/CU MM (ref 4–10.5)

## 2024-02-06 PROCEDURE — 2580000003 HC RX 258: Performed by: STUDENT IN AN ORGANIZED HEALTH CARE EDUCATION/TRAINING PROGRAM

## 2024-02-06 PROCEDURE — 80053 COMPREHEN METABOLIC PANEL: CPT

## 2024-02-06 PROCEDURE — 6360000002 HC RX W HCPCS: Performed by: STUDENT IN AN ORGANIZED HEALTH CARE EDUCATION/TRAINING PROGRAM

## 2024-02-06 PROCEDURE — 6370000000 HC RX 637 (ALT 250 FOR IP): Performed by: STUDENT IN AN ORGANIZED HEALTH CARE EDUCATION/TRAINING PROGRAM

## 2024-02-06 PROCEDURE — 83036 HEMOGLOBIN GLYCOSYLATED A1C: CPT

## 2024-02-06 PROCEDURE — 94761 N-INVAS EAR/PLS OXIMETRY MLT: CPT

## 2024-02-06 PROCEDURE — 85610 PROTHROMBIN TIME: CPT

## 2024-02-06 PROCEDURE — 86140 C-REACTIVE PROTEIN: CPT

## 2024-02-06 PROCEDURE — 1200000000 HC SEMI PRIVATE

## 2024-02-06 PROCEDURE — 81003 URINALYSIS AUTO W/O SCOPE: CPT

## 2024-02-06 PROCEDURE — 85652 RBC SED RATE AUTOMATED: CPT

## 2024-02-06 PROCEDURE — 99221 1ST HOSP IP/OBS SF/LOW 40: CPT | Performed by: SURGERY

## 2024-02-06 PROCEDURE — 85025 COMPLETE CBC W/AUTO DIFF WBC: CPT

## 2024-02-06 PROCEDURE — 80307 DRUG TEST PRSMV CHEM ANLYZR: CPT

## 2024-02-06 RX ORDER — MAGNESIUM SULFATE IN WATER 40 MG/ML
2000 INJECTION, SOLUTION INTRAVENOUS PRN
Status: DISCONTINUED | OUTPATIENT
Start: 2024-02-06 | End: 2024-02-07 | Stop reason: HOSPADM

## 2024-02-06 RX ORDER — ENOXAPARIN SODIUM 100 MG/ML
30 INJECTION SUBCUTANEOUS
Status: DISCONTINUED | OUTPATIENT
Start: 2024-02-06 | End: 2024-02-06

## 2024-02-06 RX ORDER — SODIUM CHLORIDE 0.9 % (FLUSH) 0.9 %
5-40 SYRINGE (ML) INJECTION PRN
Status: DISCONTINUED | OUTPATIENT
Start: 2024-02-06 | End: 2024-02-07 | Stop reason: HOSPADM

## 2024-02-06 RX ORDER — SODIUM CHLORIDE, SODIUM LACTATE, POTASSIUM CHLORIDE, CALCIUM CHLORIDE 600; 310; 30; 20 MG/100ML; MG/100ML; MG/100ML; MG/100ML
INJECTION, SOLUTION INTRAVENOUS CONTINUOUS
Status: DISPENSED | OUTPATIENT
Start: 2024-02-06 | End: 2024-02-06

## 2024-02-06 RX ORDER — OLANZAPINE 10 MG/1
20 TABLET ORAL NIGHTLY
Status: DISCONTINUED | OUTPATIENT
Start: 2024-02-06 | End: 2024-02-07 | Stop reason: HOSPADM

## 2024-02-06 RX ORDER — SODIUM CHLORIDE 9 MG/ML
INJECTION, SOLUTION INTRAVENOUS PRN
Status: DISCONTINUED | OUTPATIENT
Start: 2024-02-06 | End: 2024-02-07 | Stop reason: HOSPADM

## 2024-02-06 RX ORDER — GABAPENTIN 600 MG/1
300 TABLET ORAL
Status: DISCONTINUED | OUTPATIENT
Start: 2024-02-06 | End: 2024-02-06

## 2024-02-06 RX ORDER — PANTOPRAZOLE SODIUM 40 MG/1
40 TABLET, DELAYED RELEASE ORAL
Status: DISCONTINUED | OUTPATIENT
Start: 2024-02-06 | End: 2024-02-07 | Stop reason: HOSPADM

## 2024-02-06 RX ORDER — POLYETHYLENE GLYCOL 3350 17 G/17G
17 POWDER, FOR SOLUTION ORAL DAILY PRN
Status: DISCONTINUED | OUTPATIENT
Start: 2024-02-06 | End: 2024-02-07 | Stop reason: HOSPADM

## 2024-02-06 RX ORDER — DIAZEPAM 5 MG/1
5 TABLET ORAL 2 TIMES DAILY
Status: DISCONTINUED | OUTPATIENT
Start: 2024-02-07 | End: 2024-02-07

## 2024-02-06 RX ORDER — SODIUM CHLORIDE 0.9 % (FLUSH) 0.9 %
5-40 SYRINGE (ML) INJECTION EVERY 12 HOURS SCHEDULED
Status: DISCONTINUED | OUTPATIENT
Start: 2024-02-06 | End: 2024-02-07 | Stop reason: HOSPADM

## 2024-02-06 RX ORDER — AMLODIPINE BESYLATE 5 MG/1
5 TABLET ORAL DAILY
Status: DISCONTINUED | OUTPATIENT
Start: 2024-02-06 | End: 2024-02-07 | Stop reason: HOSPADM

## 2024-02-06 RX ORDER — ACETAMINOPHEN 650 MG/1
650 SUPPOSITORY RECTAL EVERY 6 HOURS PRN
Status: DISCONTINUED | OUTPATIENT
Start: 2024-02-06 | End: 2024-02-07 | Stop reason: HOSPADM

## 2024-02-06 RX ORDER — POTASSIUM CHLORIDE 7.45 MG/ML
10 INJECTION INTRAVENOUS PRN
Status: DISCONTINUED | OUTPATIENT
Start: 2024-02-06 | End: 2024-02-07 | Stop reason: HOSPADM

## 2024-02-06 RX ORDER — FAMOTIDINE 20 MG/1
20 TABLET, FILM COATED ORAL 2 TIMES DAILY
Status: DISCONTINUED | OUTPATIENT
Start: 2024-02-06 | End: 2024-02-07 | Stop reason: HOSPADM

## 2024-02-06 RX ORDER — ACETAMINOPHEN 325 MG/1
650 TABLET ORAL EVERY 6 HOURS PRN
Status: DISCONTINUED | OUTPATIENT
Start: 2024-02-06 | End: 2024-02-07 | Stop reason: HOSPADM

## 2024-02-06 RX ORDER — ONDANSETRON 2 MG/ML
4 INJECTION INTRAMUSCULAR; INTRAVENOUS EVERY 6 HOURS PRN
Status: DISCONTINUED | OUTPATIENT
Start: 2024-02-06 | End: 2024-02-07 | Stop reason: HOSPADM

## 2024-02-06 RX ORDER — OXCARBAZEPINE 300 MG/1
300 TABLET, FILM COATED ORAL 2 TIMES DAILY
Status: DISCONTINUED | OUTPATIENT
Start: 2024-02-06 | End: 2024-02-07 | Stop reason: HOSPADM

## 2024-02-06 RX ORDER — DIAZEPAM 5 MG/1
5 TABLET ORAL EVERY 8 HOURS
Status: COMPLETED | OUTPATIENT
Start: 2024-02-06 | End: 2024-02-07

## 2024-02-06 RX ORDER — ENOXAPARIN SODIUM 100 MG/ML
40 INJECTION SUBCUTANEOUS
Status: DISCONTINUED | OUTPATIENT
Start: 2024-02-06 | End: 2024-02-07 | Stop reason: HOSPADM

## 2024-02-06 RX ORDER — NICOTINE 21 MG/24HR
1 PATCH, TRANSDERMAL 24 HOURS TRANSDERMAL DAILY PRN
Status: DISCONTINUED | OUTPATIENT
Start: 2024-02-06 | End: 2024-02-07 | Stop reason: HOSPADM

## 2024-02-06 RX ORDER — OLANZAPINE 5 MG/1
5 TABLET ORAL NIGHTLY
Status: DISCONTINUED | OUTPATIENT
Start: 2024-02-06 | End: 2024-02-06

## 2024-02-06 RX ORDER — ONDANSETRON 4 MG/1
4 TABLET, ORALLY DISINTEGRATING ORAL EVERY 8 HOURS PRN
Status: DISCONTINUED | OUTPATIENT
Start: 2024-02-06 | End: 2024-02-07 | Stop reason: HOSPADM

## 2024-02-06 RX ORDER — GABAPENTIN 300 MG/1
600 CAPSULE ORAL 3 TIMES DAILY
Status: DISCONTINUED | OUTPATIENT
Start: 2024-02-06 | End: 2024-02-07 | Stop reason: HOSPADM

## 2024-02-06 RX ADMIN — CEFTRIAXONE 1000 MG: 1 INJECTION, POWDER, FOR SOLUTION INTRAMUSCULAR; INTRAVENOUS at 17:15

## 2024-02-06 RX ADMIN — Medication 3 MG: at 02:49

## 2024-02-06 RX ADMIN — GABAPENTIN 600 MG: 300 CAPSULE ORAL at 02:55

## 2024-02-06 RX ADMIN — DIAZEPAM 5 MG: 5 TABLET ORAL at 17:15

## 2024-02-06 RX ADMIN — OLANZAPINE 20 MG: 10 TABLET, FILM COATED ORAL at 22:18

## 2024-02-06 RX ADMIN — SODIUM CHLORIDE, PRESERVATIVE FREE 10 ML: 5 INJECTION INTRAVENOUS at 09:46

## 2024-02-06 RX ADMIN — FAMOTIDINE 20 MG: 20 TABLET ORAL at 09:36

## 2024-02-06 RX ADMIN — FAMOTIDINE 20 MG: 20 TABLET ORAL at 02:55

## 2024-02-06 RX ADMIN — LORAZEPAM 0.5 MG: 2 INJECTION INTRAMUSCULAR; INTRAVENOUS at 22:50

## 2024-02-06 RX ADMIN — GABAPENTIN 600 MG: 300 CAPSULE ORAL at 09:36

## 2024-02-06 RX ADMIN — OXCARBAZEPINE 300 MG: 300 TABLET, FILM COATED ORAL at 11:20

## 2024-02-06 RX ADMIN — LORAZEPAM 0.5 MG: 2 INJECTION INTRAMUSCULAR; INTRAVENOUS at 10:09

## 2024-02-06 RX ADMIN — VANCOMYCIN HYDROCHLORIDE 1500 MG: 1.5 INJECTION, POWDER, LYOPHILIZED, FOR SOLUTION INTRAVENOUS at 09:44

## 2024-02-06 RX ADMIN — PANTOPRAZOLE SODIUM 40 MG: 40 TABLET, DELAYED RELEASE ORAL at 09:36

## 2024-02-06 RX ADMIN — OXCARBAZEPINE 300 MG: 300 TABLET, FILM COATED ORAL at 02:50

## 2024-02-06 RX ADMIN — GABAPENTIN 600 MG: 300 CAPSULE ORAL at 14:06

## 2024-02-06 RX ADMIN — OLANZAPINE 20 MG: 10 TABLET, FILM COATED ORAL at 02:49

## 2024-02-06 RX ADMIN — ENOXAPARIN SODIUM 40 MG: 100 INJECTION SUBCUTANEOUS at 17:15

## 2024-02-06 RX ADMIN — VANCOMYCIN HYDROCHLORIDE 1500 MG: 1.5 INJECTION, POWDER, LYOPHILIZED, FOR SOLUTION INTRAVENOUS at 22:23

## 2024-02-06 RX ADMIN — FAMOTIDINE 20 MG: 20 TABLET ORAL at 22:18

## 2024-02-06 RX ADMIN — DIAZEPAM 5 MG: 5 TABLET ORAL at 11:20

## 2024-02-06 RX ADMIN — OXCARBAZEPINE 300 MG: 300 TABLET, FILM COATED ORAL at 22:18

## 2024-02-06 RX ADMIN — Medication 3 MG: at 22:18

## 2024-02-06 RX ADMIN — GABAPENTIN 600 MG: 300 CAPSULE ORAL at 22:18

## 2024-02-06 RX ADMIN — SODIUM CHLORIDE, POTASSIUM CHLORIDE, SODIUM LACTATE AND CALCIUM CHLORIDE: 600; 310; 30; 20 INJECTION, SOLUTION INTRAVENOUS at 03:02

## 2024-02-06 ASSESSMENT — PAIN SCALES - GENERAL: PAINLEVEL_OUTOF10: 0

## 2024-02-06 ASSESSMENT — ENCOUNTER SYMPTOMS: COLOR CHANGE: 1

## 2024-02-06 NOTE — ED PROVIDER NOTES
abscess/infection/ h/o of IVDU; ORDERING SYSTEM PROVIDED HISTORY: open wound ; h/o of IVDU TECHNOLOGIST PROVIDED HISTORY: Reason for exam:->open wound ; h/o of IVDU Reason for Exam: open wound ; h/o of IVDU FINDINGS: Right radius/ulna x-ray: No acute fracture, dislocation or malalignment.  The wrist and elbow appear intact.  Soft tissue swelling can be seen in the proximal forearm.  No soft tissue air. Left radius/ulna x-ray: No acute fracture, dislocation or malalignment.  A soft tissue catheter can be seen in the proximal forearm which may be a foreign body.  This may represent an intravenous cannula.  Mild soft tissue swelling in the proximal forearm and suggestion of some soft tissue air superficially which may represent an abscess.     1. No acute osseous abnormality. 2. Soft tissue swelling in the proximal forearms bilaterally. 3. Soft tissue catheter in the proximal forearm on the left may represent an intravenous cannula. 4. Possible soft tissue abscess in the proximal forearm on the left.      Final ED Course and MDM:  CC/HPI Summary, DDx, ED Course, and Reassessment:  Patient presents to the ED with chief complaint of infection of the right forearm.  Initial seen by Michelle Gregorio CNP.  Pending CT at time of sign-out.  There is no abscess noted.  I discussed case with Dr. Patel who will admit.    Final Impression      1. Cellulitis of right forearm    2. Polysubstance abuse (HCC)        DISPOSITION Admitted 02/05/2024 10:43:22 PM     (Please note that portions of this note may have been completed with a voice recognition program. Efforts were made to edit the dictations but occasionally words are mis-transcribed.)    Heidi Santos PA-C   [employer]        Heidi Santos PA-C  02/06/24 0125    
glucose of 155 but no leukocytosis and normal lactate and CK.  X-rays of bilateral forearms were obtained and show no evidence of soft tissue gas but there was potential soft tissue abscesses noted on both arms.  Clinically there is an ulcerative lesion on the left forearm but it does not appear cellulitic or obviously abscessed at this time.  A CT of the right arm was ordered and is currently pending.  IV antibiotics were initiated and blood cultures were ordered.  Additionally, the patient has been off of fentanyl and cocaine for the last 4 days.  He feels like he is starting to have withdrawal symptoms.  He is not having any tachycardia, shakiness, diarrhea, vomiting.  He is feeling a little anxious.  He was requesting Ativan.  Advised him that I would defer treatment for his withdrawal to the hospitalist as he will require admission.  Patient was agreeable to this.    1900: I have signed out Luis Alberto Garcia's Emergency Department care to MARVIN [place]. We discussed the pertinent history, physical exam, completed/pending test results (if applicable) and current treatment plan. Please refer to his/her chart for the patients remaining Emergency Department course and final disposition.      I am the Primary Clinician of Record.         CLINICAL IMPRESSION      1. Cellulitis of right forearm    2. Polysubstance abuse (HCC)          DISPOSITION/PLAN   DISPOSITION        PATIENT REFERREDTO:  No follow-up provider specified.    DISCHARGE MEDICATIONS:  New Prescriptions    No medications on file       DISCONTINUED MEDICATIONS:  Discontinued Medications    No medications on file              (Please note that portions ofthis note were completed with a voice recognition program.  Efforts were made to edit the dictations but occasionally words are mis-transcribed.)    ANA M MUÑIZ CNP (electronically signed)             Michelle Gregorio APRN - CNP  02/05/24 1933

## 2024-02-06 NOTE — PROGRESS NOTES
PHARMACY VANCOMYCIN MONITORING SERVICE  Pharmacy consulted by Dr. Rudy Caceres MD for monitoring and adjustment.    Indication for treatment: Vancomycin indication: SSTI with risk factors   Goal trough: Trough Goal: 10-15 mcg/mL  AUC/MACKENZIE: <500    Risk Factors for MRSA Identified:   Received IV antibiotics within the past 90 days, Current or recent history of IVDA    Pertinent Laboratory Values:   Temp Readings from Last 3 Encounters:   02/06/24 98.1 °F (36.7 °C) (Oral)   12/28/23 97.6 °F (36.4 °C) (Temporal)   12/19/23 97.8 °F (36.6 °C) (Oral)     Recent Labs     02/05/24  1533 02/06/24  0700   WBC 10.0 7.2     Recent Labs     02/05/24  1533 02/06/24  0700   BUN 7 8   CREATININE 0.9 0.8*     Estimated Creatinine Clearance: 169 mL/min (A) (based on SCr of 0.8 mg/dL (L)).    Intake/Output Summary (Last 24 hours) at 2/6/2024 0829  Last data filed at 2/6/2024 0732  Gross per 24 hour   Intake 669.81 ml   Output --   Net 669.81 ml     Last Encounter Weight:  Wt Readings from Last 3 Encounters:   02/05/24 134.7 kg (297 lb)   12/18/23 134.7 kg (297 lb)   12/06/23 135.3 kg (298 lb 3.2 oz)       In: 669.8   Out: -      Pertinent Cultures:   Date    Source    Results  02/05   Blood    Sent  02/05   Wound x 2   Sent     Vancomycin level:   TROUGH:  No results for input(s): \"VANCOTROUGH\" in the last 72 hours.  RANDOM:  No results for input(s): \"VANCORANDOM\" in the last 72 hours.    Assessment:  HPI: Luis Alberto Garcia is a 48 yoM with a past medical history of HTN, GERD, Tobacco abuse, HCV (untreated), BD1/schizophrenia, and obestity that presented to [facility] ED with complants of right arm pain. He was found to have a wound of the left forearm and right arm cellulitis. He has a history of IVDU and MRSA. He had an I&D 12/17/23 that grew MRSA and Peptoniphilus Asaccharolyticus.   Interval History: New start 2/6/2024  SCr, BUN, and urine output:   Scr 0.8 mg/dL today, baseline ~0.8 to 0.9 mg/dL.  BUN WNL  No urine output data

## 2024-02-06 NOTE — H&P
History and Physical      Name:  Luis Alberto Garcia /Age/Sex: 1975  (48 y.o. male)   MRN & CSN:  [id number] & [id number] Encounter Date/Time: 2024 10:28 PM   Location:  ED19/ED-19 PCP: No primary care provider on file.       Hospital Day: 1    Assessment and Plan:     Patient is a 48-year-old male who presented with right arm pain.    # Right arm cellulitis   # Wound of left forearm   - Endorsed worsening right arm swelling for past few weeks following IVDU. No fevers or discharge. Hx of recurrent abscesses secondary to IVDU with hx of MRSA, had I&D on 2023.   - Non-purulent, NVI distally. Afebrile, no leukocytosis, normal LA. CT with extensive subcutaneous edema, no abscess identified.  - Started on Vanc/Rocephin, continue, follow-up cultures and inflammatory markers. GSx and WC consulted, appreciate assistance, NPO pending evaluation. Arm elevation.    # Illicit drug use  - Endorsed regular IV injection of fentanyl and crack cocaine, no recent methamphetamine use. Last use on .   - Monitor for sxs of withdrawal, COWS protocol. Interested in rehab, SW consulted.    # Essential hypretension  - Hold Norvasc due to borderline hypotension.    # GERD  - Continue PPI.    # Tobacco abuse  - Hx of smoking 1 PPD for 25 years.  - Advised on importance of complete cessation.  - Nicotine patches prn.    # Untreated HCV  - Not candidate for treatment in setting of active IVDU.    # Hyperglycemia  - Follow-up A1c.    # BD1 / schizophrenia  - Continue Zyprexa and Trileptal.    # Class II obesity  - BMI 36.2.  - Advised on importance of lifestyle modifications.    Checklist:  Advanced directive: full  Diet: NPO pending surgical evaluation  DVT ppx: Lovenox    Disposition: admit to inpatient.  Estimated discharge: 2-3 day(s).  Current living situation: home.  Expected disposition: home.    Spoke with ED provider who recommended admission for the patient and I agree with that plan.  Personally reviewed lab

## 2024-02-06 NOTE — ED NOTES
ED TO INPATIENT SBAR HANDOFF    Patient Name: Luis Alberto Garcia   :  1975  48 y.o.   Preferred Name  Luis Alberto  Family/Caregiver Present no   Restraints no   C-SSRS: Risk of Suicide: No Risk  Sitter no   Sepsis Risk Score Sepsis Risk Score: 0.84      Situation  Chief Complaint   Patient presents with    Abscess     Right arm, hx of IV drug use- last use 10 days ago     Heartburn     Brief Description of Patient's Condition: 48 y.o. male who presents for evaluation of right arm abscess which has been going on for the last couple of weeks.  Patient is an IV drug user and subcutaneously injects fentanyl and crack cocaine.  These sites in the right arm are from using a couple of weeks ago.  Patient states he has been off cocaine and fentanyl for the last 4 days.  He is starting to feel a little jittery like he is having symptoms of withdrawal beginning.  He does want to get clean.  He has not had any fevers, chills, nausea, vomiting.  Patient has history of recurrent abscesses secondary to this with prior history of MRSA.  Additionally the patient is reporting some heartburn which has been ongoing and is associated with some left upper quadrant pain.  He is scheduled for an endoscopy in 3 days with Dr. Floyd for this.  He is not taking any medication for this at this time and denies any change in symptoms but wanted to mention it.   Mental Status: oriented, alert, coherent, logical, thought processes intact, and able to concentrate and follow conversation  Arrived from: home    Imaging:   CT RADIUS ULNA RIGHT W CONTRAST   Final Result   1. Extensive subcutaneous edema throughout the forearm and wrist compatible   with cellulitis.  No abscess identified.   2. Small foci of gas in the antecubital fossa and dorsum of the wrist likely   related to recent injection versus less likely infection with a gas-forming   organism.   3. No acute osseous abnormality identified.         XR RADIUS ULNA RIGHT (2 VIEWS)   Final

## 2024-02-06 NOTE — PROGRESS NOTES
V2.0    [facility] Progress Note      Name:  Luis Alberto Garcia /Age/Sex: 1975  (48 y.o. male)   MRN & CSN:  [id number] & [id number] Encounter Date/Time: 2024 11:00 AM EST   Location:  ED19/ED-19 PCP: No primary care provider on file.     Attending:Rudy Caceres MD       Hospital Day: 2    Assessment and Recommendations   Luis Alberto Garcia is a 48 y.o. male who presents with Left arm cellulitis      Plan:      Right arm cellulitis   # Wound of left forearm   - Endorsed worsening right arm swelling for past few weeks following IVDU. No fevers or discharge. Hx of recurrent abscesses secondary to IVDU with hx of MRSA, had I&D on 2023.   - Non-purulent, NVI distally. Afebrile, no leukocytosis, normal LA. CT with extensive subcutaneous edema, no abscess identified.  - Started on Vanc/Rocephin, continue, follow-up cultures and inflammatory markers.  General surgery and wound care consulted.  As per general surgery does not require surgical intervention.     # Polysubstance abuse with withdrawal  - Endorsed regular IV injection of fentanyl and crack cocaine, no recent methamphetamine use. Last use on .   -  COWS protocol. Interested in rehab, SW consulted.  -Started on diazepam due to signs of anxiety and anxiety agitation.     # Essential hypretension  - Hold Norvasc due to borderline hypotension.     # GERD  - Continue PPI.     # Tobacco abuse  - Hx of smoking 1 PPD for 25 years.  - Advised on importance of complete cessation.  - Nicotine patches prn.     # Untreated HCV  - Not candidate for treatment in setting of active IVDU.     # Hyperglycemia  - Follow-up A1c.     # BD1 / schizophrenia  - Continue Zyprexa and Trileptal.     # Class II obesity  - BMI 36.2.  - Advised on importance of lifestyle modifications.      Diet ADULT DIET; Regular   DVT Prophylaxis [x] Lovenox, []  Heparin, [] SCDs, [] Ambulation,  [] Eliquis, [] Xarelto  Chief Complaint   Patient presents with    Abscess     Right arm, hx of

## 2024-02-06 NOTE — PROGRESS NOTES
4 Eyes Skin Assessment     NAME:  Luis Alberto Garcia  YOB: 1975  MEDICAL RECORD NUMBER:  [id number]    The patient is being assess for  Admission    I agree that 2 RN's have performed a thorough Head to Toe Skin Assessment on the patient. ALL assessment sites listed below have been assessed.      Areas assessed by both nurses:    Other no pressure injuries, just cellutitis in left arm        Does the Patient have a Wound? No noted wound(s)       Fabian Prevention initiated:  No   Wound Care Orders initiated:  No    Pressure Injury (Stage 3,4, Unstageable, DTI, NWPT, and Complex wounds) if present place consult order under :: No    New and Established Ostomies if present place consult order under : No      Nurse 1 eSignature: Electronically signed by Prasad Dey RN on 2/6/24 at 6:55 PM EST    **SHARE this note so that the co-signing nurse is able to place an eSignature**    Nurse 2 eSignature: Electronically signed by Laura Chand LPN on 2/6/24 at 6:56 PM EST

## 2024-02-07 VITALS
TEMPERATURE: 98.6 F | WEIGHT: 300 LBS | HEIGHT: 76 IN | DIASTOLIC BLOOD PRESSURE: 110 MMHG | OXYGEN SATURATION: 95 % | SYSTOLIC BLOOD PRESSURE: 187 MMHG | HEART RATE: 100 BPM | RESPIRATION RATE: 20 BRPM | BODY MASS INDEX: 36.53 KG/M2

## 2024-02-07 PROBLEM — L03.113 CELLULITIS OF RIGHT FOREARM: Status: ACTIVE | Noted: 2024-02-07

## 2024-02-07 LAB
DOSE AMOUNT: NORMAL
DOSE TIME: NORMAL
VANCOMYCIN RANDOM: 8.5 UG/ML

## 2024-02-07 PROCEDURE — 36415 COLL VENOUS BLD VENIPUNCTURE: CPT

## 2024-02-07 PROCEDURE — 99232 SBSQ HOSP IP/OBS MODERATE 35: CPT | Performed by: SURGERY

## 2024-02-07 PROCEDURE — 80202 ASSAY OF VANCOMYCIN: CPT

## 2024-02-07 PROCEDURE — 6370000000 HC RX 637 (ALT 250 FOR IP): Performed by: STUDENT IN AN ORGANIZED HEALTH CARE EDUCATION/TRAINING PROGRAM

## 2024-02-07 PROCEDURE — 2580000003 HC RX 258: Performed by: STUDENT IN AN ORGANIZED HEALTH CARE EDUCATION/TRAINING PROGRAM

## 2024-02-07 RX ORDER — HYDROXYZINE PAMOATE 25 MG/1
50 CAPSULE ORAL EVERY 8 HOURS PRN
Status: DISCONTINUED | OUTPATIENT
Start: 2024-02-07 | End: 2024-02-07 | Stop reason: HOSPADM

## 2024-02-07 RX ORDER — SULFAMETHOXAZOLE AND TRIMETHOPRIM 800; 160 MG/1; MG/1
1 TABLET ORAL 2 TIMES DAILY
Qty: 24 TABLET | Refills: 0 | Status: SHIPPED | OUTPATIENT
Start: 2024-02-07 | End: 2024-02-19

## 2024-02-07 RX ORDER — BUPRENORPHINE HYDROCHLORIDE AND NALOXONE HYDROCHLORIDE DIHYDRATE 2; .5 MG/1; MG/1
2 TABLET SUBLINGUAL PRN
Status: DISCONTINUED | OUTPATIENT
Start: 2024-02-07 | End: 2024-02-07

## 2024-02-07 RX ORDER — LORAZEPAM 2 MG/ML
0.5 INJECTION INTRAMUSCULAR EVERY 6 HOURS PRN
Status: DISCONTINUED | OUTPATIENT
Start: 2024-02-07 | End: 2024-02-07

## 2024-02-07 RX ORDER — SULFAMETHOXAZOLE AND TRIMETHOPRIM 800; 160 MG/1; MG/1
1 TABLET ORAL 2 TIMES DAILY
Status: DISCONTINUED | OUTPATIENT
Start: 2024-02-07 | End: 2024-02-07 | Stop reason: HOSPADM

## 2024-02-07 RX ORDER — DIAZEPAM 5 MG/1
5 TABLET ORAL 2 TIMES DAILY
Status: DISCONTINUED | OUTPATIENT
Start: 2024-02-07 | End: 2024-02-07 | Stop reason: HOSPADM

## 2024-02-07 RX ORDER — CLONIDINE HYDROCHLORIDE 0.1 MG/1
0.1 TABLET ORAL EVERY 6 HOURS PRN
Status: DISCONTINUED | OUTPATIENT
Start: 2024-02-07 | End: 2024-02-07 | Stop reason: HOSPADM

## 2024-02-07 RX ORDER — BUPRENORPHINE AND NALOXONE 2; .5 MG/1; MG/1
2 FILM, SOLUBLE BUCCAL; SUBLINGUAL PRN
Status: DISCONTINUED | OUTPATIENT
Start: 2024-02-07 | End: 2024-02-07 | Stop reason: HOSPADM

## 2024-02-07 RX ADMIN — METHOCARBAMOL 750 MG: 500 TABLET ORAL at 09:11

## 2024-02-07 RX ADMIN — PANTOPRAZOLE SODIUM 40 MG: 40 TABLET, DELAYED RELEASE ORAL at 06:27

## 2024-02-07 RX ADMIN — SODIUM CHLORIDE, PRESERVATIVE FREE 10 ML: 5 INJECTION INTRAVENOUS at 09:13

## 2024-02-07 RX ADMIN — GABAPENTIN 600 MG: 300 CAPSULE ORAL at 09:12

## 2024-02-07 RX ADMIN — FAMOTIDINE 20 MG: 20 TABLET ORAL at 09:11

## 2024-02-07 RX ADMIN — DIAZEPAM 5 MG: 5 TABLET ORAL at 09:29

## 2024-02-07 RX ADMIN — DIAZEPAM 5 MG: 5 TABLET ORAL at 02:33

## 2024-02-07 ASSESSMENT — PAIN SCALES - GENERAL: PAINLEVEL_OUTOF10: 10

## 2024-02-07 ASSESSMENT — ENCOUNTER SYMPTOMS: COLOR CHANGE: 1

## 2024-02-07 ASSESSMENT — PAIN DESCRIPTION - DESCRIPTORS: DESCRIPTORS: ACHING

## 2024-02-07 ASSESSMENT — PAIN DESCRIPTION - LOCATION: LOCATION: OTHER (COMMENT)

## 2024-02-07 NOTE — PROGRESS NOTES
General Surgery-Dr. Marrero    [facility] Day: 3    Chief Complaint on Admission: RUE cellulitis      Subjective:     Moved from ED to .   Pt is apparently declining IV Abx per pt's nurse.   Tolerating PO.  Seen by Wound Team.   Asking about going home.         ROS:  Review of Systems   Skin:  Positive for color change and wound.   Psychiatric/Behavioral:  Positive for agitation.    All other systems reviewed and are negative.      Allergies  Codeine          Diagnosis Date    Anxiety     Back pain     Bipolar 1 disorder (HCC)     Bipolar affect, depressed (HCC)     Bipolar disorder (HCC)     Bladder cancer (HCC)     Cancer (HCC)     bladder    Depression     Hepatitis C     Herniated disc     lumbar    Osteoarthritis     Psychosis (HCC)     Schizoaffective disorder (HCC)     Schizophrenia (HCC)     Scoliosis        Objective:     Vitals:    24 0830   BP: (!) 187/110   Pulse: 100   Resp: 20   Temp: 98.6 °F (37 °C)   SpO2: 95%       TEMPERATURE:  Current -Temp: 98.6 °F (37 °C); Max - Temp  Av.2 °F (36.8 °C)  Min: 98 °F (36.7 °C)  Max: 98.6 °F (37 °C)    No intake/output data recorded.I/O last 3 completed shifts:  In: 669.8 [I.V.:410; IV Piggyback:259.9]  Out: -       Physical Exam:  Physical Exam  Vitals reviewed.   HENT:      Head: Normocephalic and atraumatic.      Right Ear: External ear normal.      Left Ear: External ear normal.   Eyes:      General:         Right eye: No discharge.         Left eye: No discharge.   Cardiovascular:      Rate and Rhythm: Tachycardia present.   Pulmonary:      Effort: Pulmonary effort is normal.   Abdominal:      Palpations: Abdomen is soft.   Musculoskeletal:         General: Swelling (RUE edema, multiple areas of cellulitis with eschars at injections sites, intact motor and sensation) present.   Neurological:      General: No focal deficit present.      Mental Status: He is alert.           Scheduled Meds:   diazePAM  5 mg Oral BID    [Held by provider] amLODIPine  5

## 2024-02-07 NOTE — PROGRESS NOTES
Outpatient Pharmacy Progress Note for Meds-to-Beds    Total number of Prescriptions Filled: 1  The following medications were dispensed to the patient during the discharge process:  Bactrim     Additional Documentation:  Patient picked-up the medication(s) in the OP Pharmacy      Thank you for letting [place] [address]    Phone: [phone]    Fax: [phone]

## 2024-02-07 NOTE — PROGRESS NOTES
V2.0    [facility] [place]      Name:  Luis Alberto Garcia /Age/Sex: 1975  (48 y.o. male)   MRN & CSN:  [id number] & [id number] Encounter Date/Time: 2024 11:00 AM EST   Location:  [address] [place] [address]-A PCP: No primary care provider on file.     Attending:Rudy Caceres MD       Hospital Day: 3    Assessment and Recommendations   Luis Alberto Garcia is a 48 y.o. male who presents with Left arm cellulitis      Plan:      Right arm cellulitis   # Wound of left forearm   - Endorsed worsening right arm swelling for past few weeks following IVDU. No fevers or discharge. Hx of recurrent abscesses secondary to IVDU with hx of MRSA, had I&D on 2023.   - Non-purulent, NVI distally. Afebrile, no leukocytosis, normal LA. CT with extensive subcutaneous edema, no abscess identified.  - Started on Vanc/Rocephin, continue, follow-up cultures and inflammatory markers.  General surgery and wound care consulted.  As per general surgery does not require surgical intervention.  ID consulted given MRSA and wound to determine length of therapy as he has active IV drug use.    # Polysubstance abuse with withdrawal  - Endorsed regular IV injection of fentanyl and crack cocaine, no recent methamphetamine use. Last use on .   -  COWS protocol. Interested in rehab, SW consulted.  -Started on diazepam due to signs of anxiety and anxiety agitation.  Added on Suboxone, clonidine which the patient said he may refuse.     # Essential hypretension  - Hold Norvasc due to borderline hypotension.     # GERD  - Continue PPI.     # Tobacco abuse  - Hx of smoking 1 PPD for 25 years.  - Advised on importance of complete cessation.  - Nicotine patches prn.     # Untreated HCV  - Not candidate for treatment in setting of active IVDU.     # Hyperglycemia  - Follow-up A1c.     # BD1 / schizophrenia  - Continue Zyprexa and Trileptal.     # Class II obesity  - BMI 36.2.  - Advised on importance of lifestyle modifications.      Reports she has a lot of

## 2024-02-07 NOTE — DISCHARGE SUMMARY
Patient was admitted for right arm cellulitis.  Was on broad-spectrum antibiotics.  Wound culture positive for MRSA.  CT scan reviewed.  As per general surgery does not need surgical intervention.  Infectious ease was consulted.    Patient also had signs of polysubstance withdrawal.  No to be positive for fentanyl, cocaine, benzodiazepine and cannabinoids.  Was started on diazepam and COWS protocol with clonidine and Suboxone.  However patient agitated and reported he had things to take care of at home and chose to leave AMA.  Infectious disease saw him prior to leaving AMA and prescribed him a short course of Bactrim to  from his pharmacy.  Patient not suitable for discharge from my standpoint due to active withdrawal and cellulitis however patient noted to have capacity to make decisions based on my assessment and therefore chose to leave AMA.    Disposition: AGAINST MEDICAL ADVICE.

## 2024-02-07 NOTE — CONSULTS
[facility] Nurse  Consult Note       Luis Alberto Garcia  AGE: 48 y.o.   GENDER: male  : 1975  TODAY'S DATE:  2024    Subjective:     Reason for CWOCN Evaluation and Assessment: wound assessment      Luis Alberto Garcia is a 48 y.o. male referred by:   [x] Physician  [] Nursing  [] Other:     Wound Identification:  Wound Type: traumatic and IV drug use  Contributing Factors: edema, smoking, and substance abuse        PAST MEDICAL HISTORY        Diagnosis Date    Anxiety     Back pain     Bipolar 1 disorder (HCC)     Bipolar affect, depressed (HCC)     Bipolar disorder (HCC)     Bladder cancer (HCC)     Cancer (HCC)     bladder    Depression     Hepatitis C     Herniated disc     lumbar    Osteoarthritis     Psychosis (HCC)     Schizoaffective disorder (HCC)     Schizophrenia (HCC)     Scoliosis        PAST SURGICAL HISTORY    Past Surgical History:   Procedure Laterality Date    BLADDER SURGERY      States had bladder cancer surgery    LEG SURGERY Left 2021    LEFT LEG DEBRIDEMENT INCISION AND DRAINAGE performed by Josee Marroquin MD at [facility] OR    TUMOR REMOVAL      bladder S/P cancer       FAMILY HISTORY    Family History   Problem Relation Age of Onset    Diabetes Mother     Cancer Mother         lung    Heart Disease Mother     Seizures Mother     Seizures Sister     Cancer Maternal Uncle         lung    Cancer Maternal Grandmother         lung    Cancer Maternal Uncle         lung       SOCIAL HISTORY    Social History     Tobacco Use    Smoking status: Every Day     Current packs/day: 1.50     Average packs/day: 1.5 packs/day for 22.0 years (33.0 ttl pk-yrs)     Types: Cigarettes    Smokeless tobacco: Never    Tobacco comments:     PATIENT STATES THAT HE IS TRYING TO QUIT 23   Vaping Use    Vaping Use: Never used   Substance Use Topics    Alcohol use: No    Drug use: Yes     Frequency: 1.0 times per week     Types: Marijuana (Weed), IV, Methamphetamines (Crystal Meth)     
12 days for cumulative 14 days (end date 2/18/2024)   Await MRSA sensi to both wound cultures  Will discuss Hep C treatment in office  DW patient, he wants to leave and be DCd asap and would prefer to not leave AMA, wanting to be treated prior to sensi of MRSA and requesting oral therapy.   YONNY Caceres, the patient is actively withdrawing, will need to leave AMA otherwise.   Follow up with ID in 1 week please  Labs drawn on Monday, 2/12/2024:  CBC with differential, CMP, ESR, CRP  Fax results to Attn: [facility] # [phone]   OK from ID standpoint to DC when ready     Thank you for allowing me to consult in the care of this patient.  ------------------------  REASON FOR CONSULT: Infective syndrome \"IVDU cellulitis\"  Requested by: Dr. Rudy Caceres  HPI:Patient is a 48 y.o.  male with IVDU of fentanyl and crack cocaine, HTN, untreated hepatitis C, GERD, tobacco abuse, obesity, bipolar 1 disorder and schizophrenia who was admitted 2/5/2024 for further evaluation and management of worsening right arm edema for the past 3 or more weeks after IVDU of injecting fentanyl.  He has remained afebrile with no leukocytosis. His CRP was 57 and ESR 27.  BC showed NGTD at 24h.  His wound cultures from right arm and left arm both grew MRSA with pending sensitivities.  His XR of right and left radius/ulna revealed No acute osseous abnormality. Soft tissue swelling in the proximal forearms bilaterally. Soft tissue catheter in the proximal forearm on the left may represent an intravenous cannula. Possible soft tissue abscess in the proximal forearm on the left.  A CT of radius ulna right W contrast revealed extensive subcutaneous edema throughout the forearm and wrist compatible with cellulitis.  No abscess identified. Small foci of gas in the antecubital fossa and dorsum of the wrist likely related to recent injection versus less likely infection with a gas-forming organism. No acute osseous 
effort is normal.   Abdominal:      Palpations: Abdomen is soft.   Musculoskeletal:         General: Swelling (right upper extremity edema, eschars, intact motor and sensation) present.   Neurological:      Mental Status: He is alert. Mental status is at baseline.   Psychiatric:         Mood and Affect: Mood normal.           DATA:    CBC:   Lab Results   Component Value Date/Time    WBC 10.0 02/05/2024 03:33 PM    RBC 4.98 02/05/2024 03:33 PM    HGB 14.7 02/05/2024 03:33 PM    HCT 44.8 02/05/2024 03:33 PM    MCV 90.0 02/05/2024 03:33 PM    MCH 29.5 02/05/2024 03:33 PM    MCHC 32.8 02/05/2024 03:33 PM    RDW 13.2 02/05/2024 03:33 PM     02/05/2024 03:33 PM    MPV 10.1 02/05/2024 03:33 PM     CBC with Differential:    Lab Results   Component Value Date/Time    WBC 10.0 02/05/2024 03:33 PM    RBC 4.98 02/05/2024 03:33 PM    HGB 14.7 02/05/2024 03:33 PM    HCT 44.8 02/05/2024 03:33 PM     02/05/2024 03:33 PM    MCV 90.0 02/05/2024 03:33 PM    MCH 29.5 02/05/2024 03:33 PM    MCHC 32.8 02/05/2024 03:33 PM    RDW 13.2 02/05/2024 03:33 PM    NRBC 0 10/20/2012 12:08 AM    SEGSPCT 66.9 02/05/2024 03:33 PM    LYMPHOPCT 23.4 02/05/2024 03:33 PM    MONOPCT 6.8 02/05/2024 03:33 PM    BASOPCT 0.3 02/05/2024 03:33 PM    MONOSABS 0.7 02/05/2024 03:33 PM    LYMPHSABS 2.3 02/05/2024 03:33 PM    EOSABS 0.2 02/05/2024 03:33 PM    BASOSABS 0.0 02/05/2024 03:33 PM    DIFFTYPE AUTOMATED DIFFERENTIAL 02/05/2024 03:33 PM     WBC:    Lab Results   Component Value Date/Time    WBC 10.0 02/05/2024 03:33 PM     Platelets:    Lab Results   Component Value Date/Time     02/05/2024 03:33 PM     Hemoglobin/Hematocrit:    Lab Results   Component Value Date/Time    HGB 14.7 02/05/2024 03:33 PM    HCT 44.8 02/05/2024 03:33 PM     CMP:    Lab Results   Component Value Date/Time     02/05/2024 03:33 PM    K 4.3 02/05/2024 03:33 PM     02/05/2024 03:33 PM    CO2 26 02/05/2024 03:33 PM    BUN 7 02/05/2024 03:33 PM

## 2024-02-10 PROBLEM — F19.10 POLYSUBSTANCE ABUSE (HCC): Status: ACTIVE | Noted: 2024-02-10

## 2024-02-10 PROBLEM — A49.02 MRSA INFECTION: Status: ACTIVE | Noted: 2024-02-10

## 2024-02-10 LAB
CULTURE: NORMAL
CULTURE: NORMAL
Lab: NORMAL
Lab: NORMAL
SPECIMEN: NORMAL
SPECIMEN: NORMAL

## 2024-02-11 LAB
CULTURE: ABNORMAL
Lab: ABNORMAL
Lab: ABNORMAL
SPECIMEN: ABNORMAL
SPECIMEN: ABNORMAL

## 2024-02-13 ENCOUNTER — TELEPHONE (OUTPATIENT)
Dept: WOUND CARE | Age: 49
End: 2024-02-13

## 2024-04-26 ENCOUNTER — TELEPHONE (OUTPATIENT)
Dept: GASTROENTEROLOGY | Age: 49
End: 2024-04-26

## 2024-04-26 NOTE — TELEPHONE ENCOUNTER
I have been unable to reach patient yesterday or today for new insurance information! His additional contacts have disconnected phones. Letter sent today asking patient to call with his new insurance information for his procedures coming up in June.

## 2024-05-21 NOTE — ED NOTES
Reported off to Victor Manuel Benjamin RN. All questions answered. Patient left via stretcher by ambulance to [facility].      Mary Potter RN  09/09/20 [id number] oral

## 2024-06-11 ENCOUNTER — APPOINTMENT (OUTPATIENT)
Dept: ULTRASOUND IMAGING | Age: 49
End: 2024-06-11
Payer: COMMERCIAL

## 2024-06-11 ENCOUNTER — HOSPITAL ENCOUNTER (EMERGENCY)
Age: 49
Discharge: HOME | End: 2024-06-11
Attending: EMERGENCY MEDICINE
Payer: COMMERCIAL

## 2024-06-11 VITALS
WEIGHT: 300 LBS | OXYGEN SATURATION: 98 % | DIASTOLIC BLOOD PRESSURE: 103 MMHG | HEART RATE: 84 BPM | TEMPERATURE: 98.2 F | BODY MASS INDEX: 36.53 KG/M2 | HEIGHT: 76 IN | SYSTOLIC BLOOD PRESSURE: 152 MMHG | RESPIRATION RATE: 16 BRPM

## 2024-06-11 DIAGNOSIS — S81.802A WOUND OF LEFT LOWER EXTREMITY, INITIAL ENCOUNTER: ICD-10-CM

## 2024-06-11 DIAGNOSIS — S81.801A WOUND OF RIGHT LEG, INITIAL ENCOUNTER: ICD-10-CM

## 2024-06-11 DIAGNOSIS — L03.116 CELLULITIS OF BOTH LOWER EXTREMITIES: Primary | ICD-10-CM

## 2024-06-11 DIAGNOSIS — F19.90 IV DRUG USER: ICD-10-CM

## 2024-06-11 DIAGNOSIS — L03.115 CELLULITIS OF BOTH LOWER EXTREMITIES: Primary | ICD-10-CM

## 2024-06-11 LAB
ALBUMIN SERPL-MCNC: 4.3 GM/DL (ref 3.4–5)
ALP BLD-CCNC: 108 IU/L (ref 40–129)
ALT SERPL-CCNC: 21 U/L (ref 10–40)
ANION GAP SERPL CALCULATED.3IONS-SCNC: 14 MMOL/L (ref 7–16)
AST SERPL-CCNC: 20 IU/L (ref 15–37)
BASOPHILS ABSOLUTE: 0 K/CU MM
BASOPHILS RELATIVE PERCENT: 0.2 % (ref 0–1)
BILIRUB SERPL-MCNC: 0.7 MG/DL (ref 0–1)
BUN SERPL-MCNC: 13 MG/DL (ref 6–23)
CALCIUM SERPL-MCNC: 9 MG/DL (ref 8.3–10.6)
CHLORIDE BLD-SCNC: 101 MMOL/L (ref 99–110)
CO2: 24 MMOL/L (ref 21–32)
CREAT SERPL-MCNC: 1 MG/DL (ref 0.9–1.3)
DIFFERENTIAL TYPE: ABNORMAL
EOSINOPHILS ABSOLUTE: 0.1 K/CU MM
GFR, ESTIMATED: >90 ML/MIN/1.73M2
GLUCOSE SERPL-MCNC: 113 MG/DL (ref 70–99)
HCT VFR BLD CALC: 45.5 % (ref 42–52)
HEMOGLOBIN: 15.3 GM/DL (ref 13.5–18)
IMMATURE NEUTROPHIL %: 0.2 % (ref 0–0.43)
LYMPHOCYTES RELATIVE PERCENT: 35.4 % (ref 24–44)
MCH RBC QN AUTO: 28.6 PG (ref 27–31)
MCHC RBC AUTO-ENTMCNC: 33.6 % (ref 32–36)
MCV RBC AUTO: 85 FL (ref 78–100)
MONOCYTES ABSOLUTE: 0.6 K/CU MM
MONOCYTES RELATIVE PERCENT: 7 % (ref 0–4)
NEUTROPHILS ABSOLUTE: 4.7 K/CU MM
NEUTROPHILS RELATIVE PERCENT: 55.9 % (ref 36–66)
NUCLEATED RBC %: 0 %
PDW BLD-RTO: 13.9 % (ref 11.7–14.9)
PLATELET # BLD: 325 K/CU MM (ref 140–440)
PMV BLD AUTO: 9.2 FL (ref 7.5–11.1)
POTASSIUM SERPL-SCNC: 3.5 MMOL/L (ref 3.5–5.1)
PRO-BNP: 176.9 PG/ML
RBC # BLD: 5.35 M/CU MM (ref 4.6–6.2)
SODIUM BLD-SCNC: 139 MMOL/L (ref 135–145)
TOTAL IMMATURE NEUTOROPHIL: 0.02 K/CU MM
TOTAL NUCLEATED RBC: 0 K/CU MM
TOTAL PROTEIN: 8.7 GM/DL (ref 6.4–8.2)
WBC # BLD: 8.5 K/CU MM (ref 4–10.5)

## 2024-06-11 PROCEDURE — 80053 COMPREHEN METABOLIC PANEL: CPT

## 2024-06-11 PROCEDURE — 93970 EXTREMITY STUDY: CPT

## 2024-06-11 PROCEDURE — 83880 ASSAY OF NATRIURETIC PEPTIDE: CPT

## 2024-06-11 PROCEDURE — 85025 COMPLETE CBC W/AUTO DIFF WBC: CPT

## 2024-06-11 PROCEDURE — 6370000000 HC RX 637 (ALT 250 FOR IP): Performed by: EMERGENCY MEDICINE

## 2024-06-11 PROCEDURE — 99284 EMERGENCY DEPT VISIT MOD MDM: CPT

## 2024-06-11 RX ORDER — CEPHALEXIN 250 MG/1
500 CAPSULE ORAL ONCE
Status: COMPLETED | OUTPATIENT
Start: 2024-06-11 | End: 2024-06-11

## 2024-06-11 RX ORDER — VALBENAZINE 40 MG/1
40 CAPSULE ORAL DAILY
COMMUNITY

## 2024-06-11 RX ORDER — CEPHALEXIN 500 MG/1
500 CAPSULE ORAL 4 TIMES DAILY
Qty: 40 CAPSULE | Refills: 0 | Status: SHIPPED | OUTPATIENT
Start: 2024-06-11 | End: 2024-06-21

## 2024-06-11 RX ORDER — SULFAMETHOXAZOLE AND TRIMETHOPRIM 800; 160 MG/1; MG/1
1 TABLET ORAL 2 TIMES DAILY
Qty: 20 TABLET | Refills: 0 | Status: SHIPPED | OUTPATIENT
Start: 2024-06-11 | End: 2024-06-21

## 2024-06-11 RX ORDER — SULFAMETHOXAZOLE AND TRIMETHOPRIM 800; 160 MG/1; MG/1
1 TABLET ORAL ONCE
Status: COMPLETED | OUTPATIENT
Start: 2024-06-11 | End: 2024-06-11

## 2024-06-11 RX ADMIN — SULFAMETHOXAZOLE AND TRIMETHOPRIM 1 TABLET: 800; 160 TABLET ORAL at 21:17

## 2024-06-11 RX ADMIN — CEPHALEXIN 500 MG: 250 CAPSULE ORAL at 21:17

## 2024-06-11 ASSESSMENT — PAIN DESCRIPTION - ORIENTATION
ORIENTATION: RIGHT
ORIENTATION: RIGHT

## 2024-06-11 ASSESSMENT — PAIN DESCRIPTION - LOCATION
LOCATION: LEG
LOCATION: LEG

## 2024-06-11 ASSESSMENT — PAIN SCALES - GENERAL
PAINLEVEL_OUTOF10: 6
PAINLEVEL_OUTOF10: 6

## 2024-06-11 ASSESSMENT — LIFESTYLE VARIABLES
HOW OFTEN DO YOU HAVE A DRINK CONTAINING ALCOHOL: NEVER
HOW MANY STANDARD DRINKS CONTAINING ALCOHOL DO YOU HAVE ON A TYPICAL DAY: PATIENT DOES NOT DRINK

## 2024-06-11 ASSESSMENT — PAIN - FUNCTIONAL ASSESSMENT: PAIN_FUNCTIONAL_ASSESSMENT: 0-10

## 2024-06-11 NOTE — ED TRIAGE NOTES
Patient presents with c/o leg swelling, redness and pain. Believes it to be cellulitis possibly versus. Pain more severe in right leg. Symptoms x4-5 weeks. Has not been evaluated prior to this visit. Two wounds noted to right calf and one to the left calf.

## 2024-06-12 NOTE — ED PROVIDER NOTES
Emergency Department Encounter    Patient: Luis Alberto Garcia  MRN: [id number]  : 1975  Date of Evaluation: 2024  ED Provider:  Jimena Eller DO    Triage Chief Complaint:   Leg Swelling (Believes it to be cellulitis possibly versus. Pain more severe in right leg. Symptoms x4-5 weeks. Has not been evaluated prior to this visit.) and Leg Pain    [place]:  Luis Alberto Garcia is a 48 y.o. male with history of IV drug use, bipolar disorder tobacco use marijuana use restless leg syndrome psychosis depression schizophrenia hepatitis C bladder cancer herniated disc scoliosis that presents to the emergency department complaining of bilateral lower extremity pain swelling and wounds to the lower extremities.  Patient states she does IV drug use.  He states he has had wounds on both his legs for multiple months now.  States pain swelling and tenderness.  He states he has a wound that got infected.  He states he was shooting up fentanyl and crack cocaine and missed the vein and that's how he get the wounds on the lower extremities but admits to a history of MRSA.  Patient denies any chest pain.  States some shortness of breath with exertion denies any nausea vomiting diarrhea.  He states no fever chills cough no dysuria.  Patient here for evaluation.    ROS - see HPI, below listed is current ROS at time of my eval:  General:  No fevers, no chills, no weakness  Eyes:  No recent vison changes, no discharge  ENT:  No sore throat, no nasal congestion, no hearing changes  Cardiovascular:  No chest pain, no palpitations  Respiratory:  No shortness of breath, no cough, no wheezing  Gastrointestinal:  No pain, no nausea, no vomiting, no diarrhea  Musculoskeletal:  No muscle pain, no joint pain  Skin: Positive for wounds of bilateral lower extremity noted, no rash, no pruritis, no easy bruising  Neurologic:  No speech problems, no headache, no extremity numbness, no extremity tingling, no extremity weakness  Psychiatric:  No

## 2024-06-12 NOTE — ED NOTES
[place] including lactic and set of culture collected via straight stick. Patient will need US iv.

## 2024-06-12 NOTE — DISCHARGE INSTRUCTIONS
Follow-up with an established primary care physician for reevaluation.  Call for an appointment  Follow-up with the wound clinic for evaluation treatment management of bilateral leg wounds  Take Bactrim and Keflex antibiotic as prescribed and directed  Discontinue IV drug use  Return to the emergency department immediately pain fever chills nausea vomiting is lightheadedness or any worsening symptoms.

## 2024-07-22 ENCOUNTER — APPOINTMENT (OUTPATIENT)
Dept: GENERAL RADIOLOGY | Age: 49
End: 2024-07-22
Payer: COMMERCIAL

## 2024-07-22 ENCOUNTER — APPOINTMENT (OUTPATIENT)
Dept: CT IMAGING | Age: 49
End: 2024-07-22
Payer: COMMERCIAL

## 2024-07-22 ENCOUNTER — HOSPITAL ENCOUNTER (EMERGENCY)
Age: 49
Discharge: LEFT BEFORE DISCHARGE | End: 2024-07-22
Payer: COMMERCIAL

## 2024-07-22 VITALS
HEIGHT: 76 IN | BODY MASS INDEX: 36.53 KG/M2 | WEIGHT: 300 LBS | OXYGEN SATURATION: 98 % | HEART RATE: 93 BPM | TEMPERATURE: 98.4 F | RESPIRATION RATE: 23 BRPM | DIASTOLIC BLOOD PRESSURE: 81 MMHG | SYSTOLIC BLOOD PRESSURE: 103 MMHG

## 2024-07-22 DIAGNOSIS — S40.022A CONTUSION OF MULTIPLE SITES OF LEFT SHOULDER AND UPPER ARM, INITIAL ENCOUNTER: ICD-10-CM
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
## 2024-07-22 DIAGNOSIS — S40.012A CONTUSION OF MULTIPLE SITES OF LEFT SHOULDER AND UPPER ARM, INITIAL ENCOUNTER: ICD-10-CM

## 2024-07-22 DIAGNOSIS — S00.93XA CONTUSION OF HEAD, UNSPECIFIED PART OF HEAD, INITIAL ENCOUNTER: ICD-10-CM

## 2024-07-22 LAB
ALBUMIN SERPL-MCNC: 4.1 GM/DL (ref 3.4–5)
ALCOHOL SCREEN SERUM: <0.01 %WT/VOL
ALP BLD-CCNC: 121 IU/L (ref 40–129)
ALT SERPL-CCNC: 84 U/L (ref 10–40)
ANION GAP SERPL CALCULATED.3IONS-SCNC: 11 MMOL/L (ref 7–16)
AST SERPL-CCNC: 63 IU/L (ref 15–37)
BASOPHILS ABSOLUTE: 0 K/CU MM
BASOPHILS RELATIVE PERCENT: 0.5 % (ref 0–1)
BILIRUB SERPL-MCNC: 0.4 MG/DL (ref 0–1)
BUN SERPL-MCNC: 5 MG/DL (ref 6–23)
CALCIUM SERPL-MCNC: 9.2 MG/DL (ref 8.3–10.6)
CHLORIDE BLD-SCNC: 106 MMOL/L (ref 99–110)
CO2: 24 MMOL/L (ref 21–32)
CREAT SERPL-MCNC: 0.8 MG/DL (ref 0.9–1.3)
DIFFERENTIAL TYPE: ABNORMAL
EOSINOPHILS ABSOLUTE: 0.4 K/CU MM
EOSINOPHILS RELATIVE PERCENT: 4.5 % (ref 0–3)
GFR, ESTIMATED: >90 ML/MIN/1.73M2
GLUCOSE SERPL-MCNC: 115 MG/DL (ref 70–99)
HCT VFR BLD CALC: 44.3 % (ref 42–52)
HEMOGLOBIN: 15.4 GM/DL (ref 13.5–18)
IMMATURE NEUTROPHIL %: 0.8 % (ref 0–0.43)
INR BLD: 1 INDEX
LYMPHOCYTES ABSOLUTE: 2.7 K/CU MM
LYMPHOCYTES RELATIVE PERCENT: 35.2 % (ref 24–44)
MCH RBC QN AUTO: 29.6 PG (ref 27–31)
MCHC RBC AUTO-ENTMCNC: 34.8 % (ref 32–36)
MCV RBC AUTO: 85.2 FL (ref 78–100)
MONOCYTES ABSOLUTE: 0.5 K/CU MM
MONOCYTES RELATIVE PERCENT: 6.5 % (ref 0–4)
NEUTROPHILS ABSOLUTE: 4.1 K/CU MM
NEUTROPHILS RELATIVE PERCENT: 52.5 % (ref 36–66)
NUCLEATED RBC %: 0 %
PDW BLD-RTO: 13.4 % (ref 11.7–14.9)
PLATELET # BLD: 250 K/CU MM (ref 140–440)
PMV BLD AUTO: 9.7 FL (ref 7.5–11.1)
POTASSIUM SERPL-SCNC: 3.7 MMOL/L (ref 3.5–5.1)
PROTHROMBIN TIME: 13.5 SECONDS (ref 11.7–14.5)
RBC # BLD: 5.2 M/CU MM (ref 4.6–6.2)
SODIUM BLD-SCNC: 141 MMOL/L (ref 135–145)
TOTAL IMMATURE NEUTOROPHIL: 0.06 K/CU MM
TOTAL NUCLEATED RBC: 0 K/CU MM
TOTAL PROTEIN: 7.2 GM/DL (ref 6.4–8.2)
WBC # BLD: 7.8 K/CU MM (ref 4–10.5)

## 2024-07-22 PROCEDURE — 80053 COMPREHEN METABOLIC PANEL: CPT

## 2024-07-22 PROCEDURE — 85025 COMPLETE CBC W/AUTO DIFF WBC: CPT

## 2024-07-22 PROCEDURE — 85610 PROTHROMBIN TIME: CPT

## 2024-07-22 PROCEDURE — 94761 N-INVAS EAR/PLS OXIMETRY MLT: CPT

## 2024-07-22 PROCEDURE — 73090 X-RAY EXAM OF FOREARM: CPT

## 2024-07-22 PROCEDURE — G0480 DRUG TEST DEF 1-7 CLASSES: HCPCS

## 2024-07-22 PROCEDURE — 99284 EMERGENCY DEPT VISIT MOD MDM: CPT

## 2024-07-22 PROCEDURE — 71045 X-RAY EXAM CHEST 1 VIEW: CPT

## 2024-07-22 RX ORDER — SODIUM CHLORIDE 0.9 % (FLUSH) 0.9 %
5-40 SYRINGE (ML) INJECTION 2 TIMES DAILY
Status: DISCONTINUED | OUTPATIENT
Start: 2024-07-22 | End: 2024-07-22 | Stop reason: HOSPADM
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
## 2024-07-22 NOTE — DISCHARGE INSTR - COC
Continuity of Care Form    Patient Name: Luis Alberto Garcia   :  1975  MRN:  [id number]    Admit date:  2024  Discharge date:  ***    Code Status Order: Prior   Advance Directives:     Admitting Physician:  No admitting provider for patient encounter.  PCP: No primary care provider on file.    Discharging Nurse: ***  Discharging Hospital Unit/Room#: TR04/04TR-04  Discharging Unit Phone Number: ***    Emergency Contact:   Extended Emergency Contact Information  Primary Emergency Contact: Justina Vasquez  Home Phone: [phone]  Mobile Phone: [phone]  Relation: Other    Past Surgical History:  Past Surgical History:   Procedure Laterality Date    BLADDER SURGERY      States had bladder cancer surgery    LEG SURGERY Left 2021    LEFT LEG DEBRIDEMENT INCISION AND DRAINAGE performed by Josee [facility]quin MD at [facility] OR    TUMOR REMOVAL      bladder S/P cancer       Immunization History:   Immunization History   Administered Date(s) Administered    COVID-19, PFIZER PURPLE top, DILUTE for use, (age 12 y+), 30mcg/0.3mL 2021, 04/15/2021    TDaP, ADACEL (age 10y-64y), BOOSTRIX (age 10y+), IM, 0.5mL 2015       Active Problems:  Patient Active Problem List   Diagnosis Code    Bipolar affective disorder ([facility]) F31.9    Osteoarthritis of lumbar spine M47.816    Heavy smoker (more than 20 cigarettes per day) F17.210    Cannabis abuse F12.10     history of bladder cancer C67.9    Restless leg syndrome G25.81    Abscess of left thigh L02.416    Cutaneous abscess of extremity L02.419    Cellulitis L03.90    IV drug user F19.90    Skin ulcer of left thigh with fat layer exposed ([facility]) L97.122    WD-Skin ulcer of right forearm with fat layer exposed ([facility]) L98.492    WD-Skin ulcer of hand with fat layer exposed ([facility]) L98.492    Left arm cellulitis L03.114    Cellulitis of right forearm L03.113    Polysubstance abuse ([facility]) F19.10    MRSA infection A49.02       Isolation/Infection:   Isolation            No
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
## 2024-07-22 NOTE — ED NOTES
Pt understands the risk of leaving against medical advice. Dr. Owusu at bedside and reviewed risks of leaving and pt understands.

## 2024-07-22 NOTE — ED NOTES
Pt is currently falling asleep during conversation and will wake back up and explain its due to him not sleeping for days. Pt explains he is having difficulty finding words.

## 2025-03-13 ENCOUNTER — HOSPITAL ENCOUNTER (EMERGENCY)
Age: 50
Discharge: HOME | End: 2025-03-13
Payer: COMMERCIAL

## 2025-03-13 VITALS
DIASTOLIC BLOOD PRESSURE: 78 MMHG | HEART RATE: 76 BPM | WEIGHT: 270 LBS | SYSTOLIC BLOOD PRESSURE: 122 MMHG | BODY MASS INDEX: 32.88 KG/M2 | TEMPERATURE: 98.3 F | OXYGEN SATURATION: 97 % | HEIGHT: 76 IN | RESPIRATION RATE: 21 BRPM

## 2025-03-13 DIAGNOSIS — F19.90 IVDU (INTRAVENOUS DRUG USER): ICD-10-CM

## 2025-03-13 DIAGNOSIS — L02.512 ABSCESS OF DORSUM OF LEFT HAND: Primary | ICD-10-CM

## 2025-03-13 PROCEDURE — 6370000000 HC RX 637 (ALT 250 FOR IP): Performed by: PHYSICIAN ASSISTANT

## 2025-03-13 PROCEDURE — 90715 TDAP VACCINE 7 YRS/> IM: CPT | Performed by: PHYSICIAN ASSISTANT

## 2025-03-13 PROCEDURE — 90471 IMMUNIZATION ADMIN: CPT | Performed by: PHYSICIAN ASSISTANT

## 2025-03-13 PROCEDURE — 99284 EMERGENCY DEPT VISIT MOD MDM: CPT

## 2025-03-13 PROCEDURE — 6360000002 HC RX W HCPCS: Performed by: PHYSICIAN ASSISTANT

## 2025-03-13 RX ORDER — SULFAMETHOXAZOLE AND TRIMETHOPRIM 800; 160 MG/1; MG/1
1 TABLET ORAL ONCE
Status: COMPLETED | OUTPATIENT
Start: 2025-03-13 | End: 2025-03-13

## 2025-03-13 RX ORDER — LIDOCAINE HYDROCHLORIDE AND EPINEPHRINE 10; 10 MG/ML; UG/ML
5 INJECTION, SOLUTION INFILTRATION; PERINEURAL ONCE
Status: COMPLETED | OUTPATIENT
Start: 2025-03-13 | End: 2025-03-13

## 2025-03-13 RX ORDER — SULFAMETHOXAZOLE AND TRIMETHOPRIM 800; 160 MG/1; MG/1
1 TABLET ORAL 2 TIMES DAILY
Qty: 14 TABLET | Refills: 0 | Status: SHIPPED | OUTPATIENT
Start: 2025-03-13 | End: 2025-03-20

## 2025-03-13 RX ORDER — MUPIROCIN 20 MG/G
OINTMENT TOPICAL
Qty: 15 G | Refills: 0 | Status: SHIPPED | OUTPATIENT
Start: 2025-03-13

## 2025-03-13 RX ADMIN — SULFAMETHOXAZOLE AND TRIMETHOPRIM 1 TABLET: 800; 160 TABLET ORAL at 20:30

## 2025-03-13 RX ADMIN — TETANUS TOXOID, REDUCED DIPHTHERIA TOXOID AND ACELLULAR PERTUSSIS VACCINE, ADSORBED 0.5 ML: 5; 2.5; 8; 8; 2.5 SUSPENSION INTRAMUSCULAR at 20:30

## 2025-03-13 RX ADMIN — LIDOCAINE HYDROCHLORIDE,EPINEPHRINE BITARTRATE 5 ML: 10; .01 INJECTION, SOLUTION INFILTRATION; PERINEURAL at 20:31

## 2025-03-13 ASSESSMENT — PAIN DESCRIPTION - LOCATION
LOCATION: LEG
LOCATION: LEG

## 2025-03-13 ASSESSMENT — PAIN DESCRIPTION - ORIENTATION
ORIENTATION: RIGHT
ORIENTATION: RIGHT

## 2025-03-13 ASSESSMENT — PAIN SCALES - GENERAL
PAINLEVEL_OUTOF10: 6
PAINLEVEL_OUTOF10: 3

## 2025-03-13 ASSESSMENT — PAIN DESCRIPTION - DESCRIPTORS: DESCRIPTORS: ACHING

## 2025-03-13 ASSESSMENT — PAIN - FUNCTIONAL ASSESSMENT
PAIN_FUNCTIONAL_ASSESSMENT: 0-10
PAIN_FUNCTIONAL_ASSESSMENT: 0-10

## 2025-03-13 NOTE — DISCHARGE INSTRUCTIONS
Take antibiotics as directed for entire duration.  Use the prescription ointment and mupirocin on scabs and wounds   legs and arms.    If you have any hand or finger swelling, fevers, difficulty bending or straightening her fingers, worsening symptoms or any new concerns I recommend returning to emergency department for reevaluation.    Additionally, you to have further treatment or evaluation by an orthopedic hand surgeon if you have any persisting or worsening symptoms.  If needed call the orthopedics office provided to you today to discuss your visit to Emergency Department schedule appointment with one of our hand doctors.    Continue your efforts not to use, hand follow-up with your substance abuse program as you mention to us today.

## 2025-03-13 NOTE — ED PROVIDER NOTES
male who presents with above HPI. Nursing notes and vital signs reviewed.            This patient's ED course included: a personal history and physicial examination, re-evaluation prior to disposition, multiple bedside re-evaluations, continuous pulse oximetry, and complex medical decision making and emergency management.  This patient has remained hemodynamically stable and been closely monitored during their ED course.    Patient was given thefollowing medications:  Medications   sulfamethoxazole-trimethoprim (BACTRIM DS;SEPTRA DS) 800-160 MG per tablet 1 tablet (1 tablet Oral Given 3/13/25 2030)   tetanus-diphth-acell pertussis (BOOSTRIX) injection 0.5 mL (0.5 mLs IntraMUSCular Given 3/13/25 2030)   lidocaine-EPINEPHrine 1 %-1:937062 injection 5 mL (5 mLs IntraDERmal Given 3/13/25 2031)       CONSULTS: (See also MDM for details)  None      The patient tolerated their visit well.  The patient and / or the family were informed of the results of any tests, a time was given to answer questions, a plan was proposed and they agreed with plan.       CC/HPI SUMMARY, REASSESSMENT, DIFFERENTIAL DIAGNOSIS,  AND  MDM:       Patient with history, exam findings, and diagnostic results as above .External Records reviewed. Patient was nontoxic, stable.      Imaging reviewed.  If ordered, I independently reviewed imaging. However please refer to final radiologist's report for definitive impression and findings.     Differential diagnosis/MDM:  No janeway or osler nodes. No lymphangitis or decrease ROM.  No erythema.  Pt deneis BLE edema, chest pain, sob, fever, or any other concerns.  Vitals wnl.    Pt admits to IVDU, last use yesterday.  Denies SI.  On my exam he is clinically sober, cooperative, pleasant and without agitation, or evidence of disorganized thoughts or confusion.  Appears to be localized abscess.  Given sochx I did consider  but have low  suspicion  for more life and limb threatenting conditions such evidence of

## (undated) DEVICE — PACK,BASIC,IX: Brand: MEDLINE

## (undated) DEVICE — GOWN,SIRUS,POLYRNF,BRTHSLV,XLN/XL,20/CS: Brand: MEDLINE

## (undated) DEVICE — TOWEL,OR,DSP,ST,BLUE,STD,6/PK,12PK/CS: Brand: MEDLINE

## (undated) DEVICE — COUNTER NDL 30 COUNT FOAM STRP SGL MAG

## (undated) DEVICE — SPONGE LAP W18XL18IN WHT COT 4 PLY FLD STRUNG RADPQ DISP ST

## (undated) DEVICE — GLOVE ORANGE PI 7 1/2   MSG9075

## (undated) DEVICE — DRAPE,EXTREMITY,89X128,STERILE: Brand: MEDLINE

## (undated) DEVICE — BANDAGE,GAUZE,BULKEE II,4.5"X4.1YD,STRL: Brand: MEDLINE

## (undated) DEVICE — YANKAUER,FLEXIBLE HANDLE,REGLR CAPACITY: Brand: MEDLINE INDUSTRIES, INC.

## (undated) DEVICE — SYRINGE IRRIG 60ML SFT PLIABLE BLB EZ TO GRP 1 HND USE W/

## (undated) DEVICE — TUBING, SUCTION, 9/32" X 10', STRAIGHT: Brand: MEDLINE

## (undated) DEVICE — DRAPE SHEET ULTRAGARD: Brand: MEDLINE

## (undated) DEVICE — PAD,ABDOMINAL,5"X9",ST,LF,25/BX: Brand: MEDLINE INDUSTRIES, INC.

## (undated) DEVICE — TRAY PREP DRY W/ PREM GLV 2 APPL 6 SPNG 2 UNDPD 1 OVERWRAP

## (undated) DEVICE — GLOVE SURG SZ 6 THK91MIL LTX FREE SYN POLYISOPRENE ANTI

## (undated) DEVICE — SYRINGE MED 10ML LUERLOCK TIP W/O SFTY DISP

## (undated) DEVICE — MARKER SURG SKIN UTIL REGULAR/FINE 2 TIP W/ RUL AND 9 LBL

## (undated) DEVICE — DRAIN PENROSE FLAT ST 18 X 1 4 IN

## (undated) DEVICE — INTENDED FOR TISSUE SEPARATION, AND OTHER PROCEDURES THAT REQUIRE A SHARP SURGICAL BLADE TO PUNCTURE OR CUT.: Brand: BARD-PARKER ® STAINLESS STEEL BLADES

## (undated) DEVICE — GLOVE SURG SZ 65 CRM LTX FREE POLYISOPRENE POLYMER BEAD ANTI

## (undated) DEVICE — GLOVE SURG SZ 7 CRM LTX FREE POLYISOPRENE POLYMER BEAD ANTI